# Patient Record
Sex: FEMALE | Race: BLACK OR AFRICAN AMERICAN
[De-identification: names, ages, dates, MRNs, and addresses within clinical notes are randomized per-mention and may not be internally consistent; named-entity substitution may affect disease eponyms.]

---

## 2017-05-29 ENCOUNTER — HOSPITAL ENCOUNTER (EMERGENCY)
Dept: HOSPITAL 74 - JER | Age: 52
Discharge: HOME | End: 2017-05-29
Payer: COMMERCIAL

## 2017-05-29 VITALS — BODY MASS INDEX: 29 KG/M2

## 2017-05-29 VITALS — DIASTOLIC BLOOD PRESSURE: 99 MMHG | SYSTOLIC BLOOD PRESSURE: 139 MMHG | HEART RATE: 88 BPM

## 2017-05-29 VITALS — TEMPERATURE: 97 F
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
## 2017-05-29 DIAGNOSIS — F41.9: ICD-10-CM

## 2017-05-29 DIAGNOSIS — G89.29: ICD-10-CM

## 2017-05-29 DIAGNOSIS — M62.838: Primary | ICD-10-CM

## 2017-05-29 DIAGNOSIS — Z85.3: ICD-10-CM
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
## 2017-05-29 DIAGNOSIS — T14.8: ICD-10-CM
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
## 2017-05-29 DIAGNOSIS — Z87.891: ICD-10-CM

## 2017-05-29 LAB
ALBUMIN SERPL-MCNC: 3.7 G/DL (ref 3.4–5)
ALP SERPL-CCNC: 114 U/L (ref 45–117)
ALT SERPL-CCNC: 17 U/L (ref 12–78)
ANION GAP SERPL CALC-SCNC: 13 MMOL/L (ref 8–16)
APPEARANCE UR: CLEAR
AST SERPL-CCNC: 14 U/L (ref 15–37)
BASOPHILS # BLD: 1.2 % (ref 0–2)
BILIRUB SERPL-MCNC: 0.5 MG/DL (ref 0.2–1)
BILIRUB UR STRIP.AUTO-MCNC: NEGATIVE MG/DL
CALCIUM SERPL-MCNC: 9.4 MG/DL (ref 8.5–10.1)
CO2 SERPL-SCNC: 24 MMOL/L (ref 21–32)
COLOR UR: COLORLESS
CREAT SERPL-MCNC: 0.8 MG/DL (ref 0.55–1.02)
DEPRECATED RDW RBC AUTO: 13.5 % (ref 11.6–15.6)
EOSINOPHIL # BLD: 3.1 % (ref 0–4.5)
GLUCOSE SERPL-MCNC: 99 MG/DL (ref 74–106)
INR BLD: 1.12 (ref 0.82–1.09)
KETONES UR QL STRIP: NEGATIVE
LEUKOCYTE ESTERASE UR QL STRIP.AUTO: NEGATIVE
MCH RBC QN AUTO: 30.6 PG (ref 25.7–33.7)
MCHC RBC AUTO-ENTMCNC: 34 G/DL (ref 32–36)
MCV RBC: 89.9 FL (ref 80–96)
NEUTROPHILS # BLD: 46 % (ref 42.8–82.8)
NITRITE UR QL STRIP: NEGATIVE
PH UR: 8 [PH] (ref 5–8)
PLATELET # BLD AUTO: 248 K/MM3 (ref 134–434)
PMV BLD: 9.4 FL (ref 7.5–11.1)
PROT SERPL-MCNC: 7.3 G/DL (ref 6.4–8.2)
PROT UR QL STRIP: NEGATIVE
PROT UR QL STRIP: NEGATIVE
PT PNL PPP: 12.4 SEC (ref 9.98–11.88)
RBC # UR STRIP: NEGATIVE /UL
SP GR UR: 1.01 (ref 1–1.02)
TROPONIN I SERPL-MCNC: < 0.02 NG/ML (ref 0–0.05)
UROBILINOGEN UR STRIP-MCNC: NEGATIVE E.U./DL (ref 0.2–1)
WBC # BLD AUTO: 6.3 K/MM3 (ref 4–10)

## 2017-05-29 PROCEDURE — 3E023NZ INTRODUCTION OF ANALGESICS, HYPNOTICS, SEDATIVES INTO MUSCLE, PERCUTANEOUS APPROACH: ICD-10-PCS

## 2017-05-29 PROCEDURE — 3E033NZ INTRODUCTION OF ANALGESICS, HYPNOTICS, SEDATIVES INTO PERIPHERAL VEIN, PERCUTANEOUS APPROACH: ICD-10-PCS

## 2017-05-29 RX ADMIN — LORAZEPAM ONE MG: 2 INJECTION, SOLUTION INTRAMUSCULAR; INTRAVENOUS at 08:50

## 2017-05-29 RX ADMIN — LORAZEPAM ONE: 2 INJECTION, SOLUTION INTRAMUSCULAR; INTRAVENOUS at 10:42
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
## 2017-05-30 NOTE — EKG
Test Reason : 

Blood Pressure : ***/*** mmHG

Vent. Rate : 099 BPM     Atrial Rate : 099 BPM

   P-R Int : 132 ms          QRS Dur : 080 ms

    QT Int : 366 ms       P-R-T Axes : 059 -02 006 degrees

   QTc Int : 469 ms

 

NORMAL SINUS RHYTHM

NONSPECIFIC T WAVE ABNORMALITY

ABNORMAL ECG

WHEN COMPARED WITH ECG OF 16-APR-2016 13:54,

T WAVE VARIATION

Confirmed by CARLOS LOZANO MD ([id number]) on 5/30/2017 2:19:19 PM

 

Referred By:             Confirmed By:CARLOS LOZANO MD

## 2017-10-26 ENCOUNTER — HOSPITAL ENCOUNTER (INPATIENT)
Dept: HOSPITAL 74 - JER | Age: 52
LOS: 3 days | Discharge: HOME | DRG: 394 | End: 2017-10-29
Attending: INTERNAL MEDICINE | Admitting: INTERNAL MEDICINE
Payer: COMMERCIAL

## 2017-10-26 VITALS — BODY MASS INDEX: 29.7 KG/M2

## 2017-10-26 DIAGNOSIS — E86.0: ICD-10-CM

## 2017-10-26 DIAGNOSIS — Z85.3: ICD-10-CM

## 2017-10-26 DIAGNOSIS — M10.9: ICD-10-CM

## 2017-10-26 DIAGNOSIS — F41.9: ICD-10-CM

## 2017-10-26 DIAGNOSIS — Z78.0: ICD-10-CM

## 2017-10-26 DIAGNOSIS — T50.4X5A: ICD-10-CM

## 2017-10-26 DIAGNOSIS — K52.1: Primary | ICD-10-CM

## 2017-10-26 DIAGNOSIS — I10: ICD-10-CM

## 2017-10-26 DIAGNOSIS — N17.9: ICD-10-CM

## 2017-10-26 DIAGNOSIS — K21.9: ICD-10-CM

## 2017-10-26 DIAGNOSIS — Z85.41: ICD-10-CM

## 2017-10-26 LAB
ALBUMIN SERPL-MCNC: 4.4 G/DL (ref 3.4–5)
ALP SERPL-CCNC: 118 U/L (ref 45–117)
ALT SERPL-CCNC: 26 U/L (ref 12–78)
ANION GAP SERPL CALC-SCNC: 16 MMOL/L (ref 8–16)
AST SERPL-CCNC: 25 U/L (ref 15–37)
BASOPHILS # BLD: 1.2 % (ref 0–2)
BILIRUB SERPL-MCNC: 0.7 MG/DL (ref 0.2–1)
CALCIUM SERPL-MCNC: 10 MG/DL (ref 8.5–10.1)
CO2 SERPL-SCNC: 17 MMOL/L (ref 21–32)
CREAT SERPL-MCNC: 2 MG/DL (ref 0.55–1.02)
DEPRECATED RDW RBC AUTO: 14.3 % (ref 11.6–15.6)
EOSINOPHIL # BLD: 2.1 % (ref 0–4.5)
GLUCOSE SERPL-MCNC: 86 MG/DL (ref 74–106)
MCH RBC QN AUTO: 29.6 PG (ref 25.7–33.7)
MCHC RBC AUTO-ENTMCNC: 33.2 G/DL (ref 32–36)
MCV RBC: 89.1 FL (ref 80–96)
NEUTROPHILS # BLD: 45.9 % (ref 42.8–82.8)
PLATELET # BLD AUTO: 347 K/MM3 (ref 134–434)
PMV BLD: 9 FL (ref 7.5–11.1)
PROT SERPL-MCNC: 8.9 G/DL (ref 6.4–8.2)
WBC # BLD AUTO: 10.9 K/MM3 (ref 4–10)

## 2017-10-26 PROCEDURE — G0378 HOSPITAL OBSERVATION PER HR: HCPCS

## 2017-10-26 RX ADMIN — DIPHENHYDRAMINE HCL PRN MG: 25 CAPSULE ORAL at 21:53

## 2017-10-26 RX ADMIN — PREDNISONE SCH MG: 20 TABLET ORAL at 15:26

## 2017-10-26 RX ADMIN — ACETAMINOPHEN PRN MG: 325 TABLET ORAL at 19:57

## 2017-10-26 RX ADMIN — RANITIDINE SCH MG: 150 TABLET ORAL at 21:53

## 2017-10-26 RX ADMIN — SODIUM CHLORIDE SCH MLS/HR: 4.5 INJECTION, SOLUTION INTRAVENOUS at 17:45

## 2017-10-26 NOTE — PDOC
Attending Attestation





- Resident


Resident Name: Filipe Grier





- HPI


HPI: 





10/26/17 11:29


Pt presents to the ED complaining of severe pain in her R great toe that 

radiates to her leg.  Also complaining of nausea, vomiting and profuse watery 

diarrhea.  patient was seen by her PMD for toe pain and told that she had gout, 

started on colchicine.  The diarrhea and nausea  began after the colchicine.  

Denies fevers.  Also complaining of shortness of breath.  Patient has a long 

standing history of anxiety and fibromyalgia.  





- Physicial Exam


PE: 





10/26/17 11:59


Agree with residents exam.  Foot shows no signs of infection.  Patient is 

mildly tachycardic and tachypneic.  





- Medical Decision Making





10/26/17 12:01


Patient presents to the ED complaining of pain that is centered in her great 

toe but that radiates to her entire leg.  Pain has been present for several 

days but became acutely worse today. Also complains of nausea and diarrhea 

after starting colchicine that may be secondary to impaired microtubule 

function.  PAtient has a longstanding history of anxiety, and her chest 

symptoms may be secondary to anxiety, but she is tachycardic and has a history 

of malignancy.  Will check d dimer to evaluate for DVT/PE.  Will reassess. 





<Rosangela Rebollar - Last Filed: 10/26/17 11:29>





- Medical Decision Making





10/26/17 13:46


Paged Dr. Mckinney. [phone]





<Elvira Arellano - Last Filed: 10/26/17 13:46>

## 2017-10-26 NOTE — EKG
Test Reason : 

Blood Pressure : ***/*** mmHG

Vent. Rate : 101 BPM     Atrial Rate : 101 BPM

   P-R Int : 112 ms          QRS Dur : 088 ms

    QT Int : 372 ms       P-R-T Axes : 066 026 064 degrees

   QTc Int : 482 ms

 

*** POOR DATA QUALITY, INTERPRETATION MAY BE ADVERSELY AFFECTED

SINUS TACHYCARDIA

POSSIBLE LEFT ATRIAL ENLARGEMENT

LEFT VENTRICULAR HYPERTROPHY

CANNOT RULE OUT SEPTAL INFARCT , AGE UNDETERMINED

ABNORMAL ECG

WHEN COMPARED WITH ECG OF 29-MAY-2017 08:50,

NONSPECIFIC T WAVE ABNORMALITY NO LONGER EVIDENT IN INFERIOR LEADS

NONSPECIFIC T WAVE ABNORMALITY NO LONGER EVIDENT IN ANTERIOR LEADS

Confirmed by VISHAL TATE MD ([id number]) on 10/26/2017 5:21:29 PM

 

Referred By:             Confirmed By:VISHAL TATE MD
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
## 2017-10-27 LAB
ALBUMIN SERPL-MCNC: 3.5 G/DL (ref 3.4–5)
ALP SERPL-CCNC: 108 U/L (ref 45–117)
ALT SERPL-CCNC: 24 U/L (ref 12–78)
ANION GAP SERPL CALC-SCNC: 8 MMOL/L (ref 8–16)
AST SERPL-CCNC: 13 U/L (ref 15–37)
BASOPHILS # BLD: 0.4 % (ref 0–2)
BILIRUB SERPL-MCNC: 0.3 MG/DL (ref 0.2–1)
CALCIUM SERPL-MCNC: 9.1 MG/DL (ref 8.5–10.1)
CO2 SERPL-SCNC: 24 MMOL/L (ref 21–32)
CREAT SERPL-MCNC: 0.7 MG/DL (ref 0.55–1.02)
DEPRECATED RDW RBC AUTO: 14.1 % (ref 11.6–15.6)
EOSINOPHIL # BLD: 0.1 % (ref 0–4.5)
GLUCOSE SERPL-MCNC: 136 MG/DL (ref 74–106)
MCH RBC QN AUTO: 29.7 PG (ref 25.7–33.7)
MCHC RBC AUTO-ENTMCNC: 33.3 G/DL (ref 32–36)
MCV RBC: 89.1 FL (ref 80–96)
NEUTROPHILS # BLD: 81.4 % (ref 42.8–82.8)
PLATELET # BLD AUTO: 277 K/MM3 (ref 134–434)
PMV BLD: 9.3 FL (ref 7.5–11.1)
PROT SERPL-MCNC: 7.2 G/DL (ref 6.4–8.2)
WBC # BLD AUTO: 8.5 K/MM3 (ref 4–10)

## 2017-10-27 RX ADMIN — SODIUM CHLORIDE SCH MLS/HR: 4.5 INJECTION, SOLUTION INTRAVENOUS at 05:41

## 2017-10-27 RX ADMIN — RANITIDINE SCH MG: 150 TABLET ORAL at 21:17

## 2017-10-27 RX ADMIN — VENLAFAXINE HYDROCHLORIDE SCH MG: 150 CAPSULE, EXTENDED RELEASE ORAL at 12:36

## 2017-10-27 RX ADMIN — ACETAMINOPHEN PRN MG: 325 TABLET ORAL at 19:00

## 2017-10-27 RX ADMIN — ACETAMINOPHEN PRN MG: 325 TABLET ORAL at 08:30

## 2017-10-27 RX ADMIN — DIPHENHYDRAMINE HCL PRN MG: 25 CAPSULE ORAL at 10:20

## 2017-10-27 RX ADMIN — RANITIDINE SCH MG: 150 TABLET ORAL at 10:15

## 2017-10-27 RX ADMIN — PREDNISONE SCH MG: 20 TABLET ORAL at 10:14

## 2017-10-27 RX ADMIN — SODIUM CHLORIDE SCH MLS/HR: 4.5 INJECTION, SOLUTION INTRAVENOUS at 13:44
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
## 2017-10-28 LAB
ALBUMIN SERPL-MCNC: 3.6 G/DL (ref 3.4–5)
ALP SERPL-CCNC: 99 U/L (ref 45–117)
ALT SERPL-CCNC: 24 U/L (ref 12–78)
ANION GAP SERPL CALC-SCNC: 8 MMOL/L (ref 8–16)
AST SERPL-CCNC: 13 U/L (ref 15–37)
BILIRUB SERPL-MCNC: 0.2 MG/DL (ref 0.2–1)
CALCIUM SERPL-MCNC: 8.7 MG/DL (ref 8.5–10.1)
CO2 SERPL-SCNC: 24 MMOL/L (ref 21–32)
CREAT SERPL-MCNC: 0.8 MG/DL (ref 0.55–1.02)
DEPRECATED RDW RBC AUTO: 13.8 % (ref 11.6–15.6)
GLUCOSE SERPL-MCNC: 138 MG/DL (ref 74–106)
MCH RBC QN AUTO: 29.7 PG (ref 25.7–33.7)
MCHC RBC AUTO-ENTMCNC: 33.4 G/DL (ref 32–36)
MCV RBC: 89.1 FL (ref 80–96)
PLATELET # BLD EST: ADEQUATE 10*3/UL
PLATELET COMMENT2: (no result)
PMV BLD: 9.8 FL (ref 7.5–11.1)
PROT SERPL-MCNC: 6.9 G/DL (ref 6.4–8.2)
WBC # BLD AUTO: 8.5 K/MM3 (ref 4–10)

## 2017-10-28 RX ADMIN — VENLAFAXINE HYDROCHLORIDE SCH MG: 150 CAPSULE, EXTENDED RELEASE ORAL at 10:47

## 2017-10-28 RX ADMIN — PREDNISONE SCH MG: 20 TABLET ORAL at 10:46

## 2017-10-28 RX ADMIN — NAPROXEN SCH: 500 TABLET ORAL at 22:03

## 2017-10-28 RX ADMIN — RANITIDINE SCH MG: 150 TABLET ORAL at 22:02

## 2017-10-28 RX ADMIN — ACETAMINOPHEN PRN MG: 325 TABLET ORAL at 17:30

## 2017-10-28 RX ADMIN — ENOXAPARIN SODIUM SCH MG: 40 INJECTION SUBCUTANEOUS at 10:49

## 2017-10-28 RX ADMIN — SODIUM CHLORIDE SCH MLS/HR: 4.5 INJECTION, SOLUTION INTRAVENOUS at 09:45

## 2017-10-28 RX ADMIN — ACETAMINOPHEN PRN MG: 325 TABLET ORAL at 08:42

## 2017-10-28 RX ADMIN — ACETAMINOPHEN PRN MG: 325 TABLET ORAL at 22:02

## 2017-10-28 RX ADMIN — ACETAMINOPHEN PRN MG: 325 TABLET ORAL at 01:39

## 2017-10-28 RX ADMIN — SODIUM CHLORIDE SCH MLS/HR: 4.5 INJECTION, SOLUTION INTRAVENOUS at 00:26

## 2017-10-28 RX ADMIN — SODIUM CHLORIDE SCH: 4.5 INJECTION, SOLUTION INTRAVENOUS at 15:05

## 2017-10-28 RX ADMIN — NAPROXEN SCH MG: 500 TABLET ORAL at 15:09

## 2017-10-28 RX ADMIN — SODIUM CHLORIDE SCH MLS/HR: 4.5 INJECTION, SOLUTION INTRAVENOUS at 20:24

## 2017-10-28 RX ADMIN — RANITIDINE SCH MG: 150 TABLET ORAL at 10:48

## 2017-10-28 NOTE — PN
Progress Note (short form)





- Note


Progress Note: 


pt seen examined


still having pain - feet


slight improvement


anxious


no other issues


renal function back to normal





 Vital Signs











Temp  98.1 F   10/28/17 06:00


 


Pulse  77   10/28/17 06:00


 


Resp  20   10/28/17 06:00


 


BP  130/87   10/28/17 06:00


 


Pulse Ox  100   10/27/17 19:57








 Intake & Output











 10/27/17 10/28/17 10/28/17





 23:59 11:59 23:59


 


Intake Total 950 1400 


 


Balance 950 1400 


 


Intake:   


 


  IV  1200 


 


    1/2 Normal Saline 1,000  1200 





    ml @ 100 mls/hr IV ASDIR   





    [facility] Rx#:[id number]   


 


  Oral 950 200 


 


Other:   


 


  Voiding Method Toilet  


 


  # Unmeasured Voids   


 


    Void 2  








Active Medications





Acetaminophen (Tylenol -)  325 mg PO Q4H PRN


   PRN Reason: PAIN


   Stop: 10/29/17 16:15


   Last Admin: 10/28/17 08:42 Dose:  325 mg


Diphenhydramine HCl (Benadryl -)  25 mg PO Q6H PRN


   PRN Reason: FOR ITCHING


   Last Admin: 10/27/17 10:20 Dose:  25 mg


Enoxaparin Sodium (Lovenox -)  40 mg SQ DAILY [facility]


   Last Admin: 10/28/17 10:49 Dose:  40 mg


Sodium Chloride (1/2 Normal Saline)  1,000 mls @ 100 mls/hr IV ASDIR [facility]


   Last Admin: 10/28/17 09:45 Dose:  100 mls/hr


Naproxen (Naprosyn -)  500 mg PO BID [facility]


Oxycodone HCl (Roxicodone -)  5 mg PO Q4H PRN


   PRN Reason: PAIN


   Last Admin: 10/28/17 08:40 Dose:  5 mg


Prednisone (Deltasone -)  40 mg PO DAILY [facility]


   Last Admin: 10/28/17 10:46 Dose:  40 mg


Ranitidine HCl (Zantac -)  150 mg PO BID [facility]


   Last Admin: 10/28/17 10:48 Dose:  150 mg


Venlafaxine HCl (Effexor Xr -)  150 mg PO DAILY [facility]


   Last Admin: 10/28/17 10:47 Dose:  150 mg





 CBC, BMP





 10/27/17 18:06 





 10/27/17 18:06 





Physical Examination


Constitutional: Yes: No Distress, Anxious


Eyes: Yes: Conjunctiva Clear


Neck: Yes: Supple


Cardiovascular: Yes: Regular Rate and Rhythm


Respiratory: Yes: CTA Bilaterally


Gastrointestinal: Yes: Soft


Musculoskeletal: Yes: Other (both great toes--- tender to touch-  but decreased 

---scars present from previous surgeries  on both great toes)


Edema: No


Peripheral Pulses WNL: Yes


Neurological: Yes: Alert





Imaging





- Results


X-ray: Report Reviewed


Ultrasound: Report Reviewed


EKG: Report Reviewed





Problem List





- Problems


(1) CLARISSA (acute kidney injury)


Code(s): N17.9 - ACUTE KIDNEY FAILURE, UNSPECIFIED





(2) Diarrhea due to drug


Code(s): K52.1 - TOXIC GASTROENTERITIS AND COLITIS





(3) Anxiety


Code(s): F41.9 - ANXIETY DISORDER, UNSPECIFIED








Assessment/Plan


discussed with patient


Diarrhea likely due to colchicine--Held


Continue fluids


Monitor labs--- today's labs pending


ct better


Pain control


start on naproxyn with caution


Benadryl for itching


DVT prophylaxis


Will follow


anticipate d/c tomorrow if better














Problem List





- Problems


(1) CLARISSA (acute kidney injury)


Code(s): N17.9 - ACUTE KIDNEY FAILURE, UNSPECIFIED





(2) Diarrhea due to drug


Code(s): K52.1 - TOXIC GASTROENTERITIS AND COLITIS





(3) Anxiety


Code(s): F41.9 - ANXIETY DISORDER, UNSPECIFIED

## 2017-10-29 VITALS — TEMPERATURE: 98 F

## 2017-10-29 VITALS — SYSTOLIC BLOOD PRESSURE: 133 MMHG | DIASTOLIC BLOOD PRESSURE: 93 MMHG | HEART RATE: 90 BPM

## 2017-10-29 LAB
ANION GAP SERPL CALC-SCNC: 9 MMOL/L (ref 8–16)
BASOPHILS # BLD: 0.8 % (ref 0–2)
CALCIUM SERPL-MCNC: 8.8 MG/DL (ref 8.5–10.1)
CO2 SERPL-SCNC: 28 MMOL/L (ref 21–32)
CREAT SERPL-MCNC: 0.6 MG/DL (ref 0.55–1.02)
DEPRECATED RDW RBC AUTO: 13.8 % (ref 11.6–15.6)
EOSINOPHIL # BLD: 0.7 % (ref 0–4.5)
GLUCOSE SERPL-MCNC: 93 MG/DL (ref 74–106)
MCH RBC QN AUTO: 29.8 PG (ref 25.7–33.7)
MCHC RBC AUTO-ENTMCNC: 33.6 G/DL (ref 32–36)
MCV RBC: 88.6 FL (ref 80–96)
NEUTROPHILS # BLD: 53 % (ref 42.8–82.8)
PLATELET # BLD AUTO: 267 K/MM3 (ref 134–434)
PMV BLD: 8.7 FL (ref 7.5–11.1)
WBC # BLD AUTO: 9.1 K/MM3 (ref 4–10)

## 2017-10-29 RX ADMIN — ACETAMINOPHEN PRN MG: 325 TABLET ORAL at 05:49

## 2017-10-29 RX ADMIN — PREDNISONE SCH MG: 20 TABLET ORAL at 09:29

## 2017-10-29 RX ADMIN — RANITIDINE SCH MG: 150 TABLET ORAL at 09:30

## 2017-10-29 RX ADMIN — NAPROXEN SCH: 500 TABLET ORAL at 09:31

## 2017-10-29 RX ADMIN — SODIUM CHLORIDE SCH MLS/HR: 4.5 INJECTION, SOLUTION INTRAVENOUS at 06:38

## 2017-10-29 RX ADMIN — VENLAFAXINE HYDROCHLORIDE SCH MG: 150 CAPSULE, EXTENDED RELEASE ORAL at 09:30

## 2017-10-29 RX ADMIN — ENOXAPARIN SODIUM SCH MG: 40 INJECTION SUBCUTANEOUS at 09:30

## 2017-10-29 RX ADMIN — ACETAMINOPHEN PRN MG: 325 TABLET ORAL at 10:41

## 2017-10-29 NOTE — DS
Physical Examination


Vital Signs: 


 Vital Signs











Temperature  98 F   10/29/17 06:00


 


Pulse Rate  75   10/29/17 06:00


 


Respiratory Rate  20   10/29/17 06:00


 


Blood Pressure  148/102   10/29/17 06:00


 


O2 Sat by Pulse Oximetry (%)  100   10/28/17 21:00











Findings/Remarks: 


feels better


decreased pain


walks in hallway





Constitutional: Yes: No Distress, Calm


Cardiovascular: Yes: Regular Rate and Rhythm


Respiratory: Yes: CTA Bilaterally


Gastrointestinal: Yes: Soft


Musculoskeletal: Yes: Other (decreased tenderness)


Neurological: Yes: Alert


Labs: 


 CBC, BMP





 10/29/17 07:34 











Discharge Summary


Reason For Visit: ACUTE KIDNEY INJURY,DEHYDRATION


Current Active Problems





CLARISSA (acute kidney injury) (Acute) 


Anxiety (Acute) 


Diarrhea due to drug (Acute) 








Hospital Course: 


admitted for acute gout flare/ arf


had severe diarrhea with colchicine


treated with fluids/ pain meds


restarted nsaid for pain.


 better-- 


stable for d/c 


f/u in office in one week.


meds reconcilled .


prescribed as needed.


discussed with pt in detail





Condition: Improved





- Instructions


Referrals: 


Lubna Mckinney MD [Primary Care Provider] - 





- Home Medications


Comprehensive Discharge Medication List: 


Ambulatory Orders





Esomeprazole Magnesium [Nexium 24Hr] 20 mg PO DAILY 05/29/17 


Venlafaxine HCl ER [Effexor Xr -] 150 mg PO DAILY 05/29/17 


Alprazolam [Xanax] 0.5 mg PO TID PRN 10/26/17 


Acetaminophen [Tylenol .Regular Strength -] 325 mg PO Q4H PRN #0 tablet 10/29/

17 


Meloxicam [Mobic] 15 mg PO DAILY PRN #30 tab 10/29/17 


Prednisone [Deltasone -] 20 mg PO DAILY #6 tablet 10/29/17

## 2017-10-30 ENCOUNTER — HOSPITAL ENCOUNTER (INPATIENT)
Dept: HOSPITAL 74 - JER | Age: 52
LOS: 2 days | Discharge: LEFT BEFORE DISCHARGE | DRG: 390 | End: 2017-11-01
Payer: COMMERCIAL

## 2017-10-30 VITALS — BODY MASS INDEX: 29.7 KG/M2

## 2017-10-30 DIAGNOSIS — F41.9: ICD-10-CM

## 2017-10-30 DIAGNOSIS — K56.609: Primary | ICD-10-CM

## 2017-10-30 DIAGNOSIS — K56.7: ICD-10-CM

## 2017-10-30 DIAGNOSIS — Z85.3: ICD-10-CM

## 2017-10-30 DIAGNOSIS — R74.8: ICD-10-CM

## 2017-10-30 LAB
ALBUMIN SERPL-MCNC: 3.5 G/DL (ref 3.4–5)
ALP SERPL-CCNC: 89 U/L (ref 45–117)
ALT SERPL-CCNC: 24 U/L (ref 12–78)
ANION GAP SERPL CALC-SCNC: 9 MMOL/L (ref 8–16)
APPEARANCE UR: (no result)
AST SERPL-CCNC: 16 U/L (ref 15–37)
BACTERIA #/AREA URNS HPF: (no result) /HPF
BASOPHILS # BLD: 1.1 % (ref 0–2)
BILIRUB SERPL-MCNC: 0.6 MG/DL (ref 0.2–1)
BILIRUB UR STRIP.AUTO-MCNC: NEGATIVE MG/DL
CALCIUM SERPL-MCNC: 8.4 MG/DL (ref 8.5–10.1)
CK SERPL-CCNC: 96 IU/L (ref 26–192)
CO2 SERPL-SCNC: 25 MMOL/L (ref 21–32)
COLOR UR: (no result)
CREAT SERPL-MCNC: 0.7 MG/DL (ref 0.55–1.02)
DEPRECATED RDW RBC AUTO: 14 % (ref 11.6–15.6)
EOSINOPHIL # BLD: 1.1 % (ref 0–4.5)
GLUCOSE SERPL-MCNC: 82 MG/DL (ref 74–106)
KETONES UR QL STRIP: NEGATIVE
LEUKOCYTE ESTERASE UR QL STRIP.AUTO: (no result)
MCH RBC QN AUTO: 29.8 PG (ref 25.7–33.7)
MCHC RBC AUTO-ENTMCNC: 33.5 G/DL (ref 32–36)
MCV RBC: 88.8 FL (ref 80–96)
NEUTROPHILS # BLD: 45.9 % (ref 42.8–82.8)
NITRITE UR QL STRIP: NEGATIVE
PH UR: 7 [PH] (ref 5–8)
PLATELET # BLD AUTO: 245 K/MM3 (ref 134–434)
PMV BLD: 8.8 FL (ref 7.5–11.1)
PROT SERPL-MCNC: 7.1 G/DL (ref 6.4–8.2)
PROT UR QL STRIP: NEGATIVE
PROT UR QL STRIP: NEGATIVE
RBC # BLD AUTO: (no result) /HPF (ref 0–3)
RBC # UR STRIP: NEGATIVE /UL
SP GR UR: 1.01 (ref 1–1.03)
TROPONIN I SERPL-MCNC: < 0.02 NG/ML (ref 0–0.05)
UROBILINOGEN UR STRIP-MCNC: NEGATIVE MG/DL (ref 0.2–1)
WBC # BLD AUTO: 10.6 K/MM3 (ref 4–10)
WBC # UR AUTO: (no result) /UL (ref 3–5)
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
## 2017-10-31 RX ADMIN — SODIUM PHOSPHATE, DIBASIC AND SODIUM PHOSPHATE, MONOBASIC SCH ML: 7; 19 ENEMA RECTAL at 23:00

## 2017-10-31 RX ADMIN — MORPHINE SULFATE PRN MG: 8 INJECTION, SOLUTION INTRAMUSCULAR; INTRAVENOUS at 17:34

## 2017-10-31 RX ADMIN — MORPHINE SULFATE PRN MG: 8 INJECTION, SOLUTION INTRAMUSCULAR; INTRAVENOUS at 22:52

## 2017-10-31 RX ADMIN — MORPHINE SULFATE PRN MG: 8 INJECTION, SOLUTION INTRAMUSCULAR; INTRAVENOUS at 13:47

## 2017-10-31 RX ADMIN — LEVOFLOXACIN SCH MLS/HR: 5 INJECTION, SOLUTION INTRAVENOUS at 18:55
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
## 2017-10-31 NOTE — CON.GI
Consult


Consult Specialty:: gastroenterology 


Referred by:: Dr Demetri Reinoso





- History of Present Illness


History of Present Illness: 


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 by Dr Hanna at [facility] 2011]was asked to be seen because of SBO. She was admitted on Oct 26-Oct 

29,2017 because of diarrhea secondary to adverse of colchicine associated with 

CLARISSA. She received morphine and oxycodone during the admission. She was doing 

well until today when she developed severe abdominal pain associated with 5 day 

history  of constipation. Catscan revealed patyial SBO with fecal impaction.





- Past Medical History


Cardio/Vascular: Yes: HTN





- Alcohol/Substance Use


Hx Alcohol Use: No





- Smoking History


Smoking history: Never smoked


Have you smoked in the past 12 months: No


Aproximately how many cigarettes per day: 0


If you are a former smoker, when did you quit?: 20 years





Home Medications





- Allergies


Allergies/Adverse Reactions: 


 Allergies











Allergy/AdvReac Type Severity Reaction Status Date / Time


 


ketorolac tromethamine Allergy Intermediate Rash Verified 10/30/17 19:26





[From Toradol]     


 


colchicine Allergy   Verified 10/30/17 19:26














- Home Medications


Home Medications: 


Ambulatory Orders





Esomeprazole Magnesium [Nexium 24Hr] 20 mg PO DAILY 05/29/17 


Venlafaxine HCl ER [Effexor Xr -] 150 mg PO DAILY 05/29/17 


Alprazolam [Xanax] 0.5 mg PO TID PRN 10/26/17 


Acetaminophen [Tylenol .Regular Strength -] 325 mg PO Q4H PRN #0 tablet 10/29/

17 


Meloxicam [Mobic] 15 mg PO DAILY PRN #30 tab 10/29/17 


Prednisone [Deltasone -] 20 mg PO DAILY #6 tablet 10/29/17 











Physical Exam-GI


Vital Signs: 


 Vital Signs











Temperature  98.6 F   10/31/17 16:55


 


Pulse Rate  79   10/31/17 16:55


 


Respiratory Rate  20   10/31/17 16:55


 


Blood Pressure  117/97   10/31/17 16:55


 


O2 Sat by Pulse Oximetry (%)  100   10/31/17 10:00











Constitutional: Yes: Obese


Eyes: Yes: Conjunctiva Clear


HENT: Yes: Atraumatic


Neck: Yes: Supple


Cardiovascular: Yes: Regular Rate and Rhythm


Respiratory: Yes: CTA Bilaterally


Gastrointestinal Inspection: Yes: Distention


...Palpate: Yes: Soft, Tenderness (--diffuse).  No: Firm/Rigid, Guarding, 

Hepatomegaly, Mass, Pulsatile Mass, Splenomegaly


...Percussion: Yes: Tympanitic


Labs: 


CBC,CMP











WBC  10.6 K/mm3 (4.0-10.0)  H  10/30/17  21:30    


 


RBC  4.52 M/mm3 (3.60-5.2)   10/30/17  21:30    


 


Hgb  13.5 GM/dL (10.7-15.3)   10/30/17  21:30    


 


Hct  40.1 % (32.4-45.2)   10/30/17  21:30    


 


MCV  88.8 fl (80-96)   10/30/17  21:30    


 


MCH  29.8 pg (25.7-33.7)   10/30/17  21:30    


 


MCHC  33.5 g/dl (32.0-36.0)   10/30/17  21:30    


 


RDW  14.0 % (11.6-15.6)   10/30/17  21:30    


 


Plt Count  245 K/MM3 (134-434)   10/30/17  21:30    


 


MPV  8.8 fl (7.5-11.1)   10/30/17  21:30    


 


Neutrophils %  45.9 % (42.8-82.8)   10/30/17  21:30    


 


Lymphocytes %  45.8 % (8-40)  H  10/30/17  21:30    


 


Monocytes %  6.1 % (3.8-10.2)  D 10/30/17  21:30    


 


Eosinophils %  1.1 % (0-4.5)   10/30/17  21:30    


 


Basophils %  1.1 % (0-2.0)   10/30/17  21:30    


 


Sodium  139 mmol/L (136-145)   10/30/17  21:30    


 


Potassium  3.6 mmol/L (3.5-5.1)   10/30/17  21:30    


 


Chloride  105 mmol/L ()   10/30/17  21:30    


 


Carbon Dioxide  25 mmol/L (21-32)   10/30/17  21:30    


 


Anion Gap  9  (8-16)   10/30/17  21:30    


 


BUN  16 mg/dL (7-18)  D 10/30/17  21:30    


 


Creatinine  0.7 mg/dL (0.55-1.02)   10/30/17  21:30    


 


Creat Clearance w eGFR  > 60  (>60)   10/30/17  21:30    


 


Random Glucose  82 mg/dL ()   10/30/17  21:30    


 


Lactic Acid  1.5 mmol/L (0.4-2.0)   10/30/17  21:30    


 


Calcium  8.4 mg/dL (8.5-10.1)  L  10/30/17  21:30    


 


Total Bilirubin  0.6 mg/dL (0.2-1.0)  D 10/30/17  21:30    


 


AST  16 U/L (15-37)  D 10/30/17  21:30    


 


ALT  24 U/L (12-78)   10/30/17  21:30    


 


Alkaline Phosphatase  89 U/L ()   10/30/17  21:30    


 


Creatine Kinase  96 IU/L ()   10/30/17  21:30    


 


Troponin I  < 0.02 ng/ml (0.00-0.05)   10/30/17  21:30    


 


Total Protein  7.1 g/dl (6.4-8.2)   10/30/17  21:30    


 


Albumin  3.5 g/dl (3.4-5.0)   10/30/17  21:30    


 


Lipase  943 U/L ()  H  10/30/17  21:30    


 


Serum Pregnancy, Qual  Negative   10/30/17  21:30    














Assessment/Plan


A>1) abdominal pain rule secondary to ileus vs SBO vs opiod induced constipation





R> keep NPO


fleet enemas as ordered to relieve the fecal impaction


FUA

## 2017-10-31 NOTE — EKG
Test Reason : 

Blood Pressure : ***/*** mmHG

Vent. Rate : 081 BPM     Atrial Rate : 081 BPM

   P-R Int : 134 ms          QRS Dur : 090 ms

    QT Int : 412 ms       P-R-T Axes : 043 -08 050 degrees

   QTc Int : 478 ms

 

NORMAL SINUS RHYTHM

INCOMPLETE LEFT BUNDLE BRANCH BLOCK

MODERATE VOLTAGE CRITERIA FOR LVH, MAY BE NORMAL VARIANT

NONSPECIFIC T WAVE ABNORMALITY

RSR' V1-V2

SMALL Q WAVE IN V2, CANNOT EXCLUDEVAN ANTEROSEPTAL WALL MI OF

INDETERMINATE AGE

PROLONGED QT

ABNORMAL ECG

WHEN COMPARED WITH ECG OF 26-OCT-2017 09:40,

CHANGES AS MENTIONED ABOVE.

NONSPECIFIC ST AND T WAVE ABNORMALITY

CLINICAL CORRELATION IS RECOMMENDED

Confirmed by PEDRO COREAS MD ([id number]) on 10/31/2017 1:50:30 PM

 

Referred By:             Confirmed By:PEDRO COREAS MD

## 2017-10-31 NOTE — CON.GI
Consult


Consult Specialty:: GI


Referred by:: Dr Ernst


Reason for Consultation:: Abdominal pain





- History of Present Illness


Chief Complaint: abdominal pain


History of Present Illness: 


52 F with h/o breast ca s/p R mastectomy and subsequent breast reconstruction, 

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 

anastomosis, mesenteric ischemia 2015, now states that after having a large BM 

yesteday she developed acute LUQ pain which became worse over time resulting in 

her ER visit. 





- History Source


History Provided By: Patient


Limitations to Obtaining History: No Limitations





- Past Medical History


Cardio/Vascular: Yes: HTN





- Past Surgical History


Past Surgical History: Yes: Colectomy, Mastectomy





- Alcohol/Substance Use


Hx Alcohol Use: No





- Smoking History


Smoking history: Never smoked


Have you smoked in the past 12 months: No


Aproximately how many cigarettes per day: 0


If you are a former smoker, when did you quit?: 20 years





Home Medications





- Allergies


Allergies/Adverse Reactions: 


 Allergies











Allergy/AdvReac Type Severity Reaction Status Date / Time


 


ketorolac tromethamine Allergy Intermediate Rash Verified 10/30/17 19:26





[From Toradol]     


 


colchicine Allergy   Verified 10/30/17 19:26














- Home Medications


Home Medications: 


Ambulatory Orders





Esomeprazole Magnesium [Nexium 24Hr] 20 mg PO DAILY 05/29/17 


Venlafaxine HCl ER [Effexor Xr -] 150 mg PO DAILY 05/29/17 


Alprazolam [Xanax] 0.5 mg PO TID PRN 10/26/17 


Acetaminophen [Tylenol .Regular Strength -] 325 mg PO Q4H PRN #0 tablet 10/29/

17 


Meloxicam [Mobic] 15 mg PO DAILY PRN #30 tab 10/29/17 


Prednisone [Deltasone -] 20 mg PO DAILY #6 tablet 10/29/17 











Physical Exam-GI


Vital Signs: 


 Vital Signs











Temperature  98.6 F   10/31/17 16:55


 


Pulse Rate  79   10/31/17 16:55


 


Respiratory Rate  20   10/31/17 16:55


 


Blood Pressure  117/97   10/31/17 16:55


 


O2 Sat by Pulse Oximetry (%)  100   10/31/17 10:00











Constitutional: Yes: Well Nourished, Calm


HENT: Yes: Normocephalic


Cardiovascular: Yes: Regular Rate and Rhythm


Respiratory: Yes: CTA Bilaterally


Gastrointestinal Inspection: Yes: WNL


...Auscultate: Yes: Other (high pitched bowel sounds)


...Palpate: Yes: Soft, Tenderness (Mostly left sided)


...Percussion: Yes: Tympanitic


Labs: 


 CBC, BMP





 10/30/17 21:30 





 10/30/17 21:30 





 Hepatic Panel











Total Bilirubin  0.6 mg/dL (0.2-1.0)  D 10/30/17  21:30    


 


AST  16 U/L (15-37)  D 10/30/17  21:30    


 


ALT  24 U/L (12-78)   10/30/17  21:30    


 


Alkaline Phosphatase  89 U/L ()   10/30/17  21:30    


 


Albumin  3.5 g/dl (3.4-5.0)   10/30/17  21:30    














Imaging





- Results


Cat Scan: Report Reviewed (As noted. Ileus vs fecal impaction with partial 

obstruction)





Assessment/Plan


Patient with above history now with acute abdominal pain and elevated lipase. 


Lipase possibly from bowel. 


As pain began after large BM, unlikely impaction


Poss ischemic event as she has a h/o mesenteric ischemia 


Rec IVF, AbRx. 


Clear liquids for now


Check lactic acid


Event seems to be resolving but needs to be watched

## 2017-11-01 VITALS — SYSTOLIC BLOOD PRESSURE: 133 MMHG | TEMPERATURE: 98.2 F | DIASTOLIC BLOOD PRESSURE: 92 MMHG | HEART RATE: 80 BPM

## 2017-11-01 LAB
ALBUMIN SERPL-MCNC: 3.4 G/DL (ref 3.4–5)
ALP SERPL-CCNC: 89 U/L (ref 45–117)
ALT SERPL-CCNC: 27 U/L (ref 12–78)
AMYLASE SERPL-CCNC: 100 U/L (ref 25–115)
ANION GAP SERPL CALC-SCNC: 7 MMOL/L (ref 8–16)
AST SERPL-CCNC: 17 U/L (ref 15–37)
BASOPHILS # BLD: 1.1 % (ref 0–2)
BILIRUB SERPL-MCNC: 0.8 MG/DL (ref 0.2–1)
CALCIUM SERPL-MCNC: 8.4 MG/DL (ref 8.5–10.1)
CO2 SERPL-SCNC: 29 MMOL/L (ref 21–32)
CREAT SERPL-MCNC: 0.7 MG/DL (ref 0.55–1.02)
DEPRECATED RDW RBC AUTO: 13.8 % (ref 11.6–15.6)
EOSINOPHIL # BLD: 5.8 % (ref 0–4.5)
GLUCOSE SERPL-MCNC: 93 MG/DL (ref 74–106)
MCH RBC QN AUTO: 29.5 PG (ref 25.7–33.7)
MCHC RBC AUTO-ENTMCNC: 33.1 G/DL (ref 32–36)
MCV RBC: 89.3 FL (ref 80–96)
NEUTROPHILS # BLD: 49.4 % (ref 42.8–82.8)
PLATELET # BLD AUTO: 234 K/MM3 (ref 134–434)
PLATELET # BLD EST: ADEQUATE 10*3/UL
PLATELET COMMENT2: (no result)
PMV BLD: 9.1 FL (ref 7.5–11.1)
PROT SERPL-MCNC: 7 G/DL (ref 6.4–8.2)
WBC # BLD AUTO: 6.7 K/MM3 (ref 4–10)

## 2017-11-01 RX ADMIN — LEVOFLOXACIN SCH: 5 INJECTION, SOLUTION INTRAVENOUS at 09:28

## 2017-11-01 RX ADMIN — SODIUM PHOSPHATE, DIBASIC AND SODIUM PHOSPHATE, MONOBASIC SCH ML: 7; 19 ENEMA RECTAL at 07:27

## 2017-11-01 RX ADMIN — SODIUM PHOSPHATE, DIBASIC AND SODIUM PHOSPHATE, MONOBASIC SCH ML: 7; 19 ENEMA RECTAL at 03:00

## 2017-11-01 NOTE — CONSULT
Consult - text type





- Consultation


Consultation Note: 


Attempted to see patient, but she has already left AMA. 





Roldan GIANG

## 2017-11-01 NOTE — DS
Physical Examination


Vital Signs: 


 Vital Signs











Temperature  98.2 F   11/01/17 07:33


 


Pulse Rate  80   11/01/17 07:33


 


Respiratory Rate  20   11/01/17 07:33


 


Blood Pressure  133/92   11/01/17 07:33


 


O2 Sat by Pulse Oximetry (%)  100   10/31/17 10:00











Labs: 


 CBC, BMP





 11/01/17 07:00 





 11/01/17 07:00 











Discharge Summary


Reason For Visit: INCREASED SERUM LIPASE LEVEL, ABDOMINAL PAIN


Current Active Problems





CLARISSA (acute kidney injury) (Acute) 


Abdominal pain (Acute) 


Anxiety (Acute) 


Diarrhea due to drug (Acute) 


Elevated lipase (Acute) 


SBO (small bowel obstruction) (Acute) 








Hospital Course: 


pt signed out AMA prior to seeing me today.


Admitted for SBO , ileus





Condition: Guarded





- Instructions


Referrals: 


Lubna Mckinney MD [Primary Care Provider] - 


Disposition: AGAINST MEDICAL ADVICE





- Home Medications


Comprehensive Discharge Medication List: 


Ambulatory Orders





Esomeprazole Magnesium [Nexium 24Hr] 20 mg PO DAILY 05/29/17 


Venlafaxine HCl ER [Effexor Xr -] 150 mg PO DAILY 05/29/17 


Alprazolam [Xanax] 0.5 mg PO TID PRN 10/26/17 


Acetaminophen [Tylenol .Regular Strength -] 325 mg PO Q4H PRN #0 tablet 10/29/

17 


Meloxicam [Mobic] 15 mg PO DAILY PRN #30 tab 10/29/17 


Prednisone [Deltasone -] 20 mg PO DAILY #6 tablet 10/29/17

## 2018-10-14 ENCOUNTER — HOSPITAL ENCOUNTER (EMERGENCY)
Dept: HOSPITAL 74 - JER | Age: 53
Discharge: HOME | End: 2018-10-14
Payer: COMMERCIAL

## 2018-10-14 VITALS — BODY MASS INDEX: 31.4 KG/M2

## 2018-10-14 VITALS — DIASTOLIC BLOOD PRESSURE: 82 MMHG | HEART RATE: 91 BPM | TEMPERATURE: 98 F | SYSTOLIC BLOOD PRESSURE: 126 MMHG

## 2018-10-14 DIAGNOSIS — B34.9: ICD-10-CM

## 2018-10-14 DIAGNOSIS — Z90.49: ICD-10-CM

## 2018-10-14 DIAGNOSIS — Z85.3: ICD-10-CM

## 2018-10-14 DIAGNOSIS — Z90.11: ICD-10-CM

## 2018-10-14 DIAGNOSIS — M79.7: ICD-10-CM

## 2018-10-14 DIAGNOSIS — G62.9: ICD-10-CM

## 2018-10-14 DIAGNOSIS — R51: Primary | ICD-10-CM

## 2018-10-14 LAB
ALBUMIN SERPL-MCNC: 3.7 G/DL (ref 3.4–5)
ALP SERPL-CCNC: 106 U/L (ref 45–117)
ALT SERPL-CCNC: 29 U/L (ref 13–61)
ANION GAP SERPL CALC-SCNC: 10 MMOL/L (ref 8–16)
APPEARANCE UR: CLEAR
AST SERPL-CCNC: 22 U/L (ref 15–37)
BASOPHILS # BLD: 0.7 % (ref 0–2)
BILIRUB SERPL-MCNC: 0.3 MG/DL (ref 0.2–1)
BILIRUB UR STRIP.AUTO-MCNC: NEGATIVE MG/DL
BUN SERPL-MCNC: 11 MG/DL (ref 7–18)
CALCIUM SERPL-MCNC: 9.2 MG/DL (ref 8.5–10.1)
CHLORIDE SERPL-SCNC: 108 MMOL/L (ref 98–107)
CO2 SERPL-SCNC: 23 MMOL/L (ref 21–32)
COLOR UR: (no result)
CREAT SERPL-MCNC: 0.7 MG/DL (ref 0.55–1.3)
DEPRECATED RDW RBC AUTO: 14.4 % (ref 11.6–15.6)
EOSINOPHIL # BLD: 5 % (ref 0–4.5)
GLUCOSE SERPL-MCNC: 96 MG/DL (ref 74–106)
HCT VFR BLD CALC: 41.6 % (ref 32.4–45.2)
HGB BLD-MCNC: 13.7 GM/DL (ref 10.7–15.3)
KETONES UR QL STRIP: NEGATIVE
LEUKOCYTE ESTERASE UR QL STRIP.AUTO: NEGATIVE
LYMPHOCYTES # BLD: 43.7 % (ref 8–40)
MAGNESIUM SERPL-MCNC: 2.1 MG/DL (ref 1.8–2.4)
MCH RBC QN AUTO: 29.9 PG (ref 25.7–33.7)
MCHC RBC AUTO-ENTMCNC: 33.1 G/DL (ref 32–36)
MCV RBC: 90.3 FL (ref 80–96)
MONOCYTES # BLD AUTO: 5.5 % (ref 3.8–10.2)
NEUTROPHILS # BLD: 45.1 % (ref 42.8–82.8)
NITRITE UR QL STRIP: NEGATIVE
PH UR: 7 [PH] (ref 5–8)
PHOSPHATE SERPL-MCNC: 3.1 MG/DL (ref 2.5–4.9)
PLATELET # BLD AUTO: 267 K/MM3 (ref 134–434)
PMV BLD: 9.1 FL (ref 7.5–11.1)
POTASSIUM SERPLBLD-SCNC: 3.9 MMOL/L (ref 3.5–5.1)
PROT SERPL-MCNC: 8.1 G/DL (ref 6.4–8.2)
PROT UR QL STRIP: NEGATIVE
PROT UR QL STRIP: NEGATIVE
RBC # BLD AUTO: 4.6 M/MM3 (ref 3.6–5.2)
SODIUM SERPL-SCNC: 140 MMOL/L (ref 136–145)
SP GR UR: 1.01 (ref 1.01–1.03)
UROBILINOGEN UR STRIP-MCNC: NEGATIVE MG/DL (ref 0.2–1)
WBC # BLD AUTO: 6.8 K/MM3 (ref 4–10)

## 2018-10-14 PROCEDURE — 3E0337Z INTRODUCTION OF ELECTROLYTIC AND WATER BALANCE SUBSTANCE INTO PERIPHERAL VEIN, PERCUTANEOUS APPROACH: ICD-10-PCS

## 2018-10-14 PROCEDURE — 3E033GC INTRODUCTION OF OTHER THERAPEUTIC SUBSTANCE INTO PERIPHERAL VEIN, PERCUTANEOUS APPROACH: ICD-10-PCS

## 2018-10-14 PROCEDURE — 3E0333Z INTRODUCTION OF ANTI-INFLAMMATORY INTO PERIPHERAL VEIN, PERCUTANEOUS APPROACH: ICD-10-PCS

## 2018-10-14 PROCEDURE — 3E023GC INTRODUCTION OF OTHER THERAPEUTIC SUBSTANCE INTO MUSCLE, PERCUTANEOUS APPROACH: ICD-10-PCS

## 2018-10-14 NOTE — PDOC
Attending Attestation





- Resident


Resident Name: Lucrecia Hernandez





- ED Attending Attestation


I have performed the following: I have examined & evaluated the patient, The 

case was reviewed & discussed with the resident, I agree w/resident's findings 

& plan, Exceptions are as noted





- HPI


HPI: 





10/14/18 09:17


53-year-old female with history of back pain, colectomy, breast cancer status 

post right-sided mastectomy, fibromyalgia, peripheral neuropathy presents with 

multiple complaints. Patient reports 2 weeks of an occipital squeezing-like 

headache with associated for photophobia, photophobia. Reports that the 

headache is constant and not relieved with medications. Stated the headache 

initially started intermittent but became persistent. Denies any prior history 

of headaches. Patient also reports that this is worsening her peripheral 

neuropathy including her chronic right shoulder paresthesias and pain, 

bilateral feet and left lower quadrant pain. Reports that these typically 

worsened 1 her bodies under stressors. Had one loose stool but no other 

findings. No nausea or vomiting. Patient also endorses developing yellow 

productive cough since yesterday. Unknown sick contacts. As a result endorses 

shortness of breath but denies chest pain. Because of these multiple symptoms, 

came to the ER for further evaluation.





- Physicial Exam


PE: 





10/14/18 09:22





GENERAL: Awake, alert, and fully oriented, in no acute distress


HEAD: No signs of trauma


EYES:EOMI, sclera anicteric, conjunctiva clear


ENT: Auricles normal inspection, hearing grossly normal, nares patent,


NECK: Normal ROM, supple,


LUNGS: Breath sounds equal, clear to auscultation bilaterally.  No wheezes, and 

no crackles. Coughing with wheezing, but no wheezing at rest.


HEART: Regular rate and rhythm, normal S1 and S2, no murmurs, rubs or gallops


ABDOMEN: Soft, TTP LLQ (reportedly chronic)  No guarding, no rebound.  No masses


EXTREMITIES: Normal range of motion, no edema. 


NEUROLOGICAL: Cranial nerves II through XII intact.  Normal speech. Equal 

strength in upper and lower extremities.


SKIN: Warm, Dry, normal turgor, no rashes or lesions noted.





- Medical Decision Making





10/14/18 09:23





 Vital Signs











Temp Pulse Resp BP Pulse Ox


 


 98.3 F   98 H  20   147/122 H  100 


 


 10/14/18 08:04  10/14/18 08:04  10/14/18 08:04  10/14/18 08:04  10/14/18 08:04








53-year-old female with multiple complaints.





1. Cough. R/o PNA, bronchitis. Obtain influenza swab. Labs including lactic acid

, blood culture. Chest xray.





2. Peripheral neuropathy: Acute worsen likely 2/2 cough and headache. Treat 

underlying symptoms. Pain control. LLQ pain likely part of this process and not 

acute. Will defer on imaging of the abdomen at this time.





3. Headache: Appears like migraine like. However, new onset headache. And given 

prior hx of cancer, will obtain a head CT to r/o masses. Treat headache and 

reassess.


10/14/18 12:51





Chest x-ray reviewed demonstrates no acute finding. Head CThas no acute 

findings. Influenza swab negative. Blood work reviewed with no acute findings 

are than a slightly elevated lactic acid which I suspect secondary to 

dehydration. Patient was given some IV fluids and some medications for the 

headache the patient feels significantly better. I suspect this may be tension-

like headache or potential viral sinusitis. The patient likely has some viral 

symptoms. It repeat lactic acid is negative, the patient and I feel comfortable 

sending the patient home. Return precautions given.


10/14/18 13:45





2nd lactic acid is negative.





**Heart Score/ECG Review


  ** #1


ECG reviewed & interpreted by me at: 08:20





10/14/18 08:34


NSR 92, TWI III, avF, V3-V6, no std/citlali, normal axis, kaiser lintervals,  

msec

## 2018-10-14 NOTE — PDOC
History of Present Illness





- General


Chief Complaint: Pain


Stated Complaint: PAIN


Time Seen by Provider: 10/14/18 07:56


History Source: Patient


Exam Limitations: No Limitations





- History of Present Illness


Initial Comments: 





10/14/18 08:12


53 year old woman with hx of subtotal colectomy, breast cancer R mastectomy 3 

years ago and fibromyalgia (on lyrica) , presents with 2 weeks of occipital 

headache that is described as throbbing and is worse in the AM and with bright 

lights, 2 days of nausea and LLQ abd pain, and cough productive of yellow/white 

phlegm and diarrhea that started this AM. Patient also reports that she has had 

muscle weakness in the bilateral thighs for 2 days and R axilla 

lymphadenopathy. She denies any recent contacts or recent travel, chest pain or 

any fevers at home. 





PMHX: as in HPI


Meds: see below


Allergies: Toradol, colchicine


Tob: none


Etoh: none


Rec drugs: none





PCP: Hank














Past History





- Past Medical History


Allergies/Adverse Reactions: 


 Allergies











Allergy/AdvReac Type Severity Reaction Status Date / Time


 


ketorolac tromethamine Allergy Intermediate Rash Verified 10/14/18 08:06





[From Toradol]     


 


colchicine Allergy   Verified 10/14/18 08:06











Home Medications: 


Ambulatory Orders





Venlafaxine HCl ER [Effexor Xr -] 150 mg PO DAILY 05/29/17 


Oxycodone HCl/Acetaminophen [Percocet 5-325 mg Tablet] 1 - 2 tab PO Q4H #20 

tablet MDD 12 03/26/18 


Acetaminophen [Tylenol] 325 mg PO BID #14 capsule 10/14/18 


Metoclopramide HCl [Reglan -] 10 mg PO TID #21 tablet 10/14/18 


Pregabalin [Lyrica -] 150 mg PO DAILY 10/14/18 








Cancer: Yes (rt breast,cervical ca)


COPD: No





- Surgical History


Abdominal Surgery:  (COLON SURGERY;SBO,MESENTERIC ISCHEMIA)





- Suicide/Smoking/Psychosocial Hx


Smoking Status: No


Smoking History: Never smoked


Have you smoked in the past 12 months: No


Number of Cigarettes Smoked Daily: 0


If you are a former smoker, when did you quit?: 20 years


Information on smoking cessation initiated: No


Hx Alcohol Use: No


Drug/Substance Use Hx: No


Substance Use Type: None


Hx Substance Use Treatment: No





**Review of Systems





- Review of Systems


Able to Perform ROS?: Yes


Is the patient limited English proficient: No


Constitutional: No: Chills, Diaphoresis, Fever


HEENTM: No: Blurred Vision, Double Vision, Tinnitus


Respiratory: Yes: See HPI, Cough, Shortness of Breath.  No: Wheezing


Cardiac (ROS): No: Chest Pain, Palpitations, Chest Tightness


ABD/GI: No: Constipated, Diarrhea, Nausea, Vomiting


: No: Burning, Dysuria, Hematuria


Musculoskeletal: No: Back Pain


Neurological: Yes: See HPI, Headache.  No: Numbness, Paresthesia, Tingling





*Physical Exam





- Vital Signs


 Last Vital Signs











Temp Pulse Resp BP Pulse Ox


 


 98.3 F   98 H  20   147/122 H  100 


 


 10/14/18 08:04  10/14/18 08:04  10/14/18 08:04  10/14/18 08:04  10/14/18 08:04














- Physical Exam


Comments: 





10/14/18 08:44


GENERAL: Awake, alert, and fully oriented, in no acute distress


HEAD: No signs of trauma, normocephalic, atraumatic 


EYES: PERRLA, EOMI, sclera anicteric, conjunctiva clear


ENT: Auricles normal inspection, hearing grossly normal, nares patent, 

oropharynx clear without


exudates. Moist mucosa


NECK: Normal ROM, supple, no lymphadenopathy, JVD, or masses


LUNGS: No distress, speaks full sentences, clear to auscultation bilaterally 


HEART: Regular rate and rhythm, normal S1 and S2, no murmurs, rubs or gallops, 

peripheral pulses normal and equal bilaterally. 


ABDOMEN: Soft, + LLQ tender to palpation, normoactive bowel sounds.  No guarding

, no rebound.  No masses


EXTREMITIES : Normal inspection, Normal range of motion, no edema.  No clubbing 

or cyanosis. + tender to touch on the R shoulder


NEUROLOGICAL: Cranial nerves II through XII grossly intact.  Normal speech, 

normal gait, no focal sensorimotor deficits 


SKIN: Warm, Dry, normal turgor, no rashes or lesions noted








ED Treatment Course





- LABORATORY


CBC & Chemistry Diagram: 


 10/14/18 08:40





 10/14/18 08:40





- RADIOLOGY


Radiology Studies Ordered: 














 Category Date Time Status


 


 CHEST X-RAY PORTABLE* [RAD] [facility]  10/14/18 08:09 Ordered














Medical Decision Making





- Medical Decision Making





53 year old woman with hx of subtotal colectomy, breast cancer R mastectomy 3 

years ago and fibromyalgia (on lyrica) , presents with 2 weeks of occipital 

headache that is described as throbbing and is worse in the AM, 2 days of 

nausea and LLQ abd pain, and cough productive of yellow/white phlegm and 

diarrhea that started this AM. Patient also reports that she has had muscle 

weakness in the bilateral thighs for 2 days and R axilla lymphadenopathy. She 

denies any recent contacts or recent travel, chest pain or any fevers at home. 





DDX including but not limited to: 


   Cough: PNA vs viral URI


   Headache: metastasis vs migraine


   Muscleache: influenza vs fibromyalgia





W/U: 


- cbc, cmp, blood cx, trop


- EKG


- CXR


- ua, ucx





TX:


- reglan


- tylenol


- 1L NS


- duoneb


- dexamethasone





ED Course: 


Patient evaluated short of breath and coughing at bedside. 


Duoneb started. 





10/14/18 11:01


UA negative


Lactic acid: 2.8 will repeat after NS completed





10/14/18 13:38


Repeat lactic acid: 1.7


Likely patient with initially elevated lactic acid 2/2 to hydration status. 





Patient advised to drink plenty of fluids. Given follow up to neurology for 

evaluation of headaches. 





Patient stable for discharge. Informed of all lab and imaging results.


Given follow up instructions and strict return precautions.


Patient expressed understanding and agrees to plan




















*DC/Admit/Observation/Transfer


Diagnosis at time of Disposition: 


 Headache, Viral syndrome








- Discharge Dispostion


Disposition: HOME


Condition at time of disposition: Stable


Decision to Admit order: No





- Prescriptions


Prescriptions: 


Acetaminophen [Tylenol] 325 mg PO BID #14 capsule


Metoclopramide HCl [Reglan -] 10 mg PO TID #21 tablet





- Referrals


Referrals: 


Lubna Mckinney MD [Primary Care Provider] - 


Philly Moncada MD [Staff Physician] - 





- Patient Instructions


Printed Discharge Instructions:  DI for Viral Syndrome, DI for Headache


Additional Instructions: 


You were seen in the ED for complaints of headache, cough, feeling unwell. 





In the ED you were evaluated with labwork and imaging.


Your results did not show any significant findings.


There does not appear to be an acute need for immediate hospitalization.





You are advised to follow up with your Primary Care Physician within 1 week.


You were given a referral to Neurology and are advised to follow up within 1 

week. 





You were given a prescription for pain relief medications. Please take as 

directed. 


Please be advised to rest and drink plenty of fluids.


Return to the ED immediately if you experience worsening headache, shortness of 

breath, cough, fever, nausea, vomiting, diarrhea, constipation, muscle weakness

, inability to walk, worsening abdominal pain, or changes in vision or hearing. 








- Post Discharge Activity


Forms/Work/School Notes:  Back to Work

## 2018-10-15 NOTE — EKG
Test Reason : 

Blood Pressure : ***/*** mmHG

Vent. Rate : 092 BPM     Atrial Rate : 092 BPM

   P-R Int : 120 ms          QRS Dur : 076 ms

    QT Int : 384 ms       P-R-T Axes : 061 009 -27 degrees

   QTc Int : 474 ms

 

NORMAL SINUS RHYTHM

SEPTAL INFARCT , AGE UNDETERMINED

ABNORMAL ECG

WHEN COMPARED WITH ECG OF 30-OCT-2017 22:15,

T WAVE VARIATION

Confirmed by CARLOS LOZANO MD ([id number]) on 10/15/2018 10:32:29 AM

 

Referred By:             Confirmed By:CARLOS LOZANO MD

## 2019-10-01 ENCOUNTER — HOSPITAL ENCOUNTER (EMERGENCY)
Dept: HOSPITAL 74 - JERFT | Age: 54
Discharge: HOME | End: 2019-10-01
Payer: COMMERCIAL

## 2019-10-01 VITALS — HEART RATE: 90 BPM | SYSTOLIC BLOOD PRESSURE: 136 MMHG | DIASTOLIC BLOOD PRESSURE: 98 MMHG | TEMPERATURE: 98.3 F

## 2019-10-01 VITALS — BODY MASS INDEX: 33.2 KG/M2

## 2019-10-01 DIAGNOSIS — Z85.41: ICD-10-CM

## 2019-10-01 DIAGNOSIS — Z88.8: ICD-10-CM

## 2019-10-01 DIAGNOSIS — Z88.6: ICD-10-CM

## 2019-10-01 DIAGNOSIS — M54.16: Primary | ICD-10-CM

## 2019-10-01 DIAGNOSIS — Z85.3: ICD-10-CM

## 2019-10-01 PROCEDURE — 3E033GC INTRODUCTION OF OTHER THERAPEUTIC SUBSTANCE INTO PERIPHERAL VEIN, PERCUTANEOUS APPROACH: ICD-10-PCS

## 2019-10-01 PROCEDURE — 3E033NZ INTRODUCTION OF ANALGESICS, HYPNOTICS, SEDATIVES INTO PERIPHERAL VEIN, PERCUTANEOUS APPROACH: ICD-10-PCS

## 2019-10-01 NOTE — PDOC
History of Present Illness





- General


Chief Complaint: Back Pain


Stated Complaint: PAIN


Time Seen by Provider: 10/01/19 13:03





- History of Present Illness


Initial Comments: 





10/01/19 13:14


55 y/o F with PMH of lumbar radiculopathy presents for evaluation of R leg 

radiculopathy and LBP increasing over the last month without any precipitating 

traumatic event unrelieved with neurontin at home. 





Past History





- Past Medical History


Allergies/Adverse Reactions: 


 Allergies











Allergy/AdvReac Type Severity Reaction Status Date / Time


 


ketorolac tromethamine Allergy Intermediate Rash Verified 10/01/19 11:57





[From Toradol]     


 


colchicine Allergy   Verified 10/01/19 11:57











Home Medications: 


Ambulatory Orders





Venlafaxine HCl ER [Effexor Xr -] 150 mg PO DAILY 05/29/17 


Oxycodone HCl/Acetaminophen [Percocet 5-325 mg Tablet] 1 - 2 tab PO Q4H #20 

tablet MDD 12 03/26/18 


Acetaminophen [Tylenol] 325 mg PO BID #14 capsule 10/14/18 


Metoclopramide HCl [Reglan -] 10 mg PO TID #21 tablet 10/14/18 


Pregabalin [Lyrica -] 150 mg PO DAILY 10/14/18 


Amoxicillin - [Amoxicillin 500mg Capsule -] 500 mg PO BID #14 capsule 10/17/18 


Acetaminophen Injection [Ofirmev Injection -] 1,000 mg IVPB ONCE #1 vial 10/01/

19 


Cyclobenzaprine HCl [Flexeril 10 mg] 10 mg PO HS PRN #10 tablet 10/01/19 


Hydrocortisone Sod Succinate [Solu-Cortef] 100 mg IVPB ONCE #1 vial 10/01/19 


Methylprednisolone [Medrol Dose Mark] 4 mg PO ASDIR #21 tablet 10/01/19 








Cancer: Yes (rt breast,cervical ca)


COPD: No


Other medical history: NEUROPATHY





- Surgical History


Abdominal Surgery:  (COLON SURGERY;SBO,MESENTERIC ISCHEMIA)





- Immunization History


Immunization Up to Date: Yes





- Psycho Social/Smoking Cessation Hx


Smoking Status: No


Smoking History: Never smoked


Have you smoked in the past 12 months: No


Number of Cigarettes Smoked Daily: 0


If you are a former smoker, when did you quit?: 20 years


Information on smoking cessation initiated: No


Hx Alcohol Use: No


Drug/Substance Use Hx: No


Substance Use Type: None


Hx Substance Use Treatment: No





**Review of Systems





- Review of Systems


Constitutional: No: Fever


: No: Incontinence


Musculoskeletal: Yes: Back Pain


Neurological: Yes: Tingling.  No: Numbness, Paresthesia





*Physical Exam





- Vital Signs


 Last Vital Signs











Temp Pulse Resp BP Pulse Ox


 


 98.3 F   90   18   136/98   100 


 


 10/01/19 11:50  10/01/19 11:50  10/01/19 11:50  10/01/19 11:50  10/01/19 11:50














- Physical Exam


Comments: 





10/01/19 13:18


There is decreased range of motion. 5 out of 5 strength in bilateral lower 

extremities.Straight leg raise test is negative bilaterally. Thighs and calves 

are soft and nontender. There are no gross sensory motor deficits. 

Neurovascularly intact.





+SLR Bilaterally 





Discharge





- Discharge Information


Problems reviewed: Yes


Clinical Impression/Diagnosis: 


 Lumbar radiculopathy





Condition: Stable


Disposition: HOME





- Admission


No





- Additional Discharge Information


Prescriptions: 


Acetaminophen Injection [Ofirmev Injection -] 1,000 mg IVPB ONCE #1 vial


Hydrocortisone Sod Succinate [Solu-Cortef] 100 mg IVPB ONCE #1 vial





- Follow up/Referral


Referrals: 


Lubna Mckinney MD [Primary Care Provider] - 


Jeromy Gross MD, FAANS [Staff Physician] - 





- Patient Discharge Instructions


Additional Instructions: 


Please start the muscle relaxer tonight and the steroid pack tomorrow as 

directed. Return to the emergency room should symptoms worsen and follow up 

with neuro surgery in 1-2 days for further evaluation and treatment options. 





- Post Discharge Activity

## 2019-11-11 ENCOUNTER — HOSPITAL ENCOUNTER (INPATIENT)
Dept: HOSPITAL 74 - JSAMEDAYSX | Age: 54
LOS: 3 days | Discharge: HOME HEALTH | DRG: 473 | End: 2019-11-14
Attending: INTERNAL MEDICINE | Admitting: INTERNAL MEDICINE
Payer: COMMERCIAL

## 2019-11-11 VITALS — BODY MASS INDEX: 34 KG/M2

## 2019-11-11 DIAGNOSIS — M47.12: Primary | ICD-10-CM

## 2019-11-11 DIAGNOSIS — F41.9: ICD-10-CM

## 2019-11-11 DIAGNOSIS — M40.202: ICD-10-CM

## 2019-11-11 DIAGNOSIS — R51: ICD-10-CM

## 2019-11-11 DIAGNOSIS — E66.8: ICD-10-CM

## 2019-11-11 DIAGNOSIS — I10: ICD-10-CM

## 2019-11-11 DIAGNOSIS — Z85.3: ICD-10-CM

## 2019-11-11 PROCEDURE — 0RB30ZZ EXCISION OF CERVICAL VERTEBRAL DISC, OPEN APPROACH: ICD-10-PCS | Performed by: NEUROLOGICAL SURGERY

## 2019-11-11 PROCEDURE — B01BZZZ FLUOROSCOPY OF SPINAL CORD: ICD-10-PCS | Performed by: NEUROLOGICAL SURGERY

## 2019-11-11 PROCEDURE — 0RG10A0 FUSION OF CERVICAL VERTEBRAL JOINT WITH INTERBODY FUSION DEVICE, ANTERIOR APPROACH, ANTERIOR COLUMN, OPEN APPROACH: ICD-10-PCS | Performed by: NEUROLOGICAL SURGERY

## 2019-11-11 PROCEDURE — 00NW0ZZ RELEASE CERVICAL SPINAL CORD, OPEN APPROACH: ICD-10-PCS | Performed by: NEUROLOGICAL SURGERY

## 2019-11-11 RX ADMIN — DOCUSATE SODIUM SCH MG: 100 CAPSULE, LIQUID FILLED ORAL at 21:43

## 2019-11-11 RX ADMIN — BENZOCAINE AND MENTHOL PRN EACH: 15; 3.6 LOZENGE ORAL at 23:11

## 2019-11-11 RX ADMIN — HEPARIN SODIUM SCH: 5000 INJECTION, SOLUTION INTRAVENOUS; SUBCUTANEOUS at 17:40

## 2019-11-11 RX ADMIN — SODIUM CHLORIDE, POTASSIUM CHLORIDE, SODIUM LACTATE AND CALCIUM CHLORIDE SCH: 600; 310; 30; 20 INJECTION, SOLUTION INTRAVENOUS at 13:49

## 2019-11-11 RX ADMIN — HEPARIN SODIUM SCH UNIT: 5000 INJECTION, SOLUTION INTRAVENOUS; SUBCUTANEOUS at 17:22

## 2019-11-11 RX ADMIN — CEFAZOLIN SCH MLS/HR: 1 INJECTION, POWDER, FOR SOLUTION INTRAVENOUS at 17:22

## 2019-11-11 RX ADMIN — DOCUSATE SODIUM SCH MG: 100 CAPSULE, LIQUID FILLED ORAL at 14:05

## 2019-11-11 NOTE — OP
Operative Note





- Note:


Operative Date: 11/11/19


Pre-Operative Diagnosis: C5/6 spondylosis


Operation: C5 Caudal hemicorpectomy, C6 rostral hemicorpectomy with 

decompression of spondylosis and reconstruction with PEEK cage and anterior 

plate


Post-Operative Diagnosis: Same as Pre-op


Surgeon: Jeromy Gross


Assistant: Sam Dowell


Anesthesiologist/CRNA: Terrance Pete


Anesthesia: General


Estimated Blood Loss (mls): 20


Operative Report Dictated: Yes

## 2019-11-11 NOTE — HP
History & Physical Update





- History


History: No Change





- Physical


Physical: No Change





- Assessment


Assessment: No Change





- Plan


Plan: No Change (Full H&P in paper chart from 10/28/19, by Lubna Mckinney MD)

## 2019-11-12 LAB
ANION GAP SERPL CALC-SCNC: 8 MMOL/L (ref 8–16)
BUN SERPL-MCNC: 9.6 MG/DL (ref 7–18)
CALCIUM SERPL-MCNC: 8.9 MG/DL (ref 8.5–10.1)
CHLORIDE SERPL-SCNC: 102 MMOL/L (ref 98–107)
CO2 SERPL-SCNC: 26 MMOL/L (ref 21–32)
CREAT SERPL-MCNC: 0.7 MG/DL (ref 0.55–1.3)
DEPRECATED RDW RBC AUTO: 13.5 % (ref 11.6–15.6)
GLUCOSE SERPL-MCNC: 94 MG/DL (ref 74–106)
HCT VFR BLD CALC: 34.9 % (ref 32.4–45.2)
HGB BLD-MCNC: 11.9 GM/DL (ref 10.7–15.3)
MCH RBC QN AUTO: 30.8 PG (ref 25.7–33.7)
MCHC RBC AUTO-ENTMCNC: 34 G/DL (ref 32–36)
MCV RBC: 90.4 FL (ref 80–96)
PLATELET # BLD AUTO: 241 K/MM3 (ref 134–434)
PMV BLD: 8.9 FL (ref 7.5–11.1)
POTASSIUM SERPLBLD-SCNC: 3.7 MMOL/L (ref 3.5–5.1)
RBC # BLD AUTO: 3.86 M/MM3 (ref 3.6–5.2)
SODIUM SERPL-SCNC: 136 MMOL/L (ref 136–145)
WBC # BLD AUTO: 9.1 K/MM3 (ref 4–10)

## 2019-11-12 RX ADMIN — FOLIC ACID SCH MG: 1 TABLET ORAL at 10:39

## 2019-11-12 RX ADMIN — VENLAFAXINE HYDROCHLORIDE SCH MG: 75 CAPSULE, EXTENDED RELEASE ORAL at 10:40

## 2019-11-12 RX ADMIN — HEPARIN SODIUM SCH: 5000 INJECTION, SOLUTION INTRAVENOUS; SUBCUTANEOUS at 03:02

## 2019-11-12 RX ADMIN — DOCUSATE SODIUM SCH MG: 100 CAPSULE, LIQUID FILLED ORAL at 21:38

## 2019-11-12 RX ADMIN — DOCUSATE SODIUM SCH MG: 100 CAPSULE, LIQUID FILLED ORAL at 05:22

## 2019-11-12 RX ADMIN — HEPARIN SODIUM SCH UNIT: 5000 INJECTION, SOLUTION INTRAVENOUS; SUBCUTANEOUS at 10:40

## 2019-11-12 RX ADMIN — CEFAZOLIN SCH MLS/HR: 1 INJECTION, POWDER, FOR SOLUTION INTRAVENOUS at 10:38

## 2019-11-12 RX ADMIN — SODIUM CHLORIDE, POTASSIUM CHLORIDE, SODIUM LACTATE AND CALCIUM CHLORIDE SCH: 600; 310; 30; 20 INJECTION, SOLUTION INTRAVENOUS at 10:56

## 2019-11-12 RX ADMIN — BENZOCAINE AND MENTHOL PRN EACH: 15; 3.6 LOZENGE ORAL at 05:22

## 2019-11-12 RX ADMIN — HEPARIN SODIUM SCH UNIT: 5000 INJECTION, SOLUTION INTRAVENOUS; SUBCUTANEOUS at 17:16

## 2019-11-12 RX ADMIN — CEFAZOLIN SCH MLS/HR: 1 INJECTION, POWDER, FOR SOLUTION INTRAVENOUS at 03:02

## 2019-11-12 RX ADMIN — FERROUS SULFATE TAB EC 324 MG (65 MG FE EQUIVALENT) SCH MG: 324 (65 FE) TABLET DELAYED RESPONSE at 10:39

## 2019-11-12 RX ADMIN — PREGABALIN SCH MG: 75 CAPSULE ORAL at 10:39

## 2019-11-12 RX ADMIN — DOCUSATE SODIUM SCH MG: 100 CAPSULE, LIQUID FILLED ORAL at 13:33

## 2019-11-12 RX ADMIN — BENZOCAINE AND MENTHOL PRN EACH: 15; 3.6 LOZENGE ORAL at 17:20

## 2019-11-12 NOTE — PROC
Procedure Note


Procedure: 





POD#1 ACDF doing well





HIWOT drain removed with tip fully intact. drain ostomy clean and dry with no 

active d/c.


Incision c/d/i with surrounding tissue intact with no tracking erythema, edema 

or evidence of collection or d/c. trachea midline.





The patient tolerated the procedure well.

## 2019-11-12 NOTE — PN
Progress Note (short form)





- Note


Progress Note: 





MEDICINE





Pt examined


POD#1


has pain in throat and neck


Examined with surgical PA


Drain removed


c/o anxiety and insomnia





 Vital Signs - 24 hr











  11/11/19 11/11/19 11/11/19





  10:45 11:00 11:15


 


Temperature   


 


Pulse Rate 92 H 83 88


 


Respiratory 18 16 16





Rate   


 


Blood Pressure 157/100 156/112 H 168/98


 


O2 Sat by Pulse 99 99 99





Oximetry (%)   














  11/11/19 11/11/19 11/11/19





  11:30 11:45 12:00


 


Temperature   


 


Pulse Rate 88 80 84


 


Respiratory 17 17 15





Rate   


 


Blood Pressure 164/114 H 160/115 H 152/90


 


O2 Sat by Pulse 99 99 96





Oximetry (%)   














  11/11/19 11/11/19 11/11/19





  12:15 12:30 12:50


 


Temperature   


 


Pulse Rate 87 88 86


 


Respiratory 17 17 18





Rate   


 


Blood Pressure 146/97 144/99 149/90


 


O2 Sat by Pulse 95 95 96





Oximetry (%)   














  11/11/19 11/11/19 11/11/19





  13:00 13:10 13:30


 


Temperature   


 


Pulse Rate 88 84 84


 


Respiratory 18 20 20





Rate   


 


Blood Pressure 144/99 150/98 149/80


 


O2 Sat by Pulse 96 96 95





Oximetry (%)   














  11/11/19 11/11/19 11/11/19





  13:35 13:56 14:30


 


Temperature 97.8 F  97.5 F L


 


Pulse Rate 88  96 H


 


Respiratory 18  20





Rate   


 


Blood Pressure 151/98  151/100


 


O2 Sat by Pulse 98 98 98





Oximetry (%)   














  11/11/19 11/11/19 11/12/19





  14:33 16:25 02:00


 


Temperature 97.5 F L 98.2 F 96.8 F L


 


Pulse Rate 96 H 93 H 87


 


Respiratory 18 20 20





Rate   


 


Blood Pressure 151/106 H 151/91 142/89


 


O2 Sat by Pulse   





Oximetry (%)   














  11/12/19





  06:00


 


Temperature 98.4 F


 


Pulse Rate 85


 


Respiratory 20





Rate 


 


Blood Pressure 141/82


 


O2 Sat by Pulse 





Oximetry (%) 








 Current Medications











Generic Name Dose Route Start Last Admin





  Trade Name Freq  PRN Reason Stop Dose Admin


 


Benzocaine/Menthol  1 each  11/11/19 22:56  11/12/19 05:22





  Cepacol Lozenge -  MM   1 each





  PRN PRN   Administration





  SORE THROAT   





     





     





     


 


Diphenhydramine HCl  25 mg  11/11/19 10:33  





  Benadryl -  PO   





  Q6H PRN   





  FOR ITCHING   





     





     





     


 


Docusate Sodium  100 mg  11/11/19 14:00  11/12/19 05:22





  Colace -  PO   100 mg





  TID [facility]   Administration





     





     





     





     


 


Fentanyl  50 mcg  11/11/19 11:25  





  Sublimaze Injection -  IVPUSH   





  [id number] PRN   





  PAIN-PACU ORDER X 4 DOSES ONLY   





     





     





     


 


Ferrous Sulfate  325 mg  11/12/19 10:00  





  Feosol -  PO   





  DAILY [facility]   





     





     





     





     


 


Folic Acid  1 mg  11/12/19 10:00  





  Folic Acid -  PO   





  DAILY [facility]   





     





     





     





     


 


Heparin Sodium (Porcine)  5,000 unit  11/11/19 18:00  11/12/19 03:02





  Heparin -  SQ   Not Given





  Q8H-IV [facility]   





     





     





     





     


 


Hydromorphone HCl  2 mg  11/11/19 11:26  11/11/19 23:34





  Dilaudid -  PO   2 mg





  Q4HWA PRN   Administration





  PAIN LEVEL 4 - 6   





     





     





     


 


Hydromorphone HCl  4 mg  11/11/19 11:26  11/12/19 05:22





  Dilaudid -  PO   4 mg





  Q4H PRN   Administration





  PAIN LEVEL 7 - 10   





     





     





     


 


Cefazolin Sodium 1 gm/  50 mls @ 100 mls/hr  11/11/19 18:00  11/12/19 03:02





  Dextrose  IVPB  11/12/19 17:59  100 mls/hr





  Q8H-IV [facility]   Administration





     





     





     





     


 


Lactated Ringer's  1,000 ml in 1,000 mls @ 125 mls/hr  11/11/19 10:45  11/11/19 

13:49





  Lactated Ringers Solution  IV   Not Given





  ASDIR [facility]   





     





     





     





     


 


Ondansetron HCl  4 mg  11/11/19 10:33  





  Zofran Injection  IVPUSH   





  Q6H PRN   





  NAUSEA   





     





     





     


 


Pregabalin  150 mg  11/12/19 10:00  





  Lyrica -  PO   





  DAILY [facility]   





     





     





     





     


 


Venlafaxine HCl  150 mg  11/12/19 10:00  





  Effexor Xr -  PO   





  DAILY [facility]   





     





     





     





     








 Laboratory Results - last 24 hr











  11/12/19 11/12/19





  07:38 07:38


 


WBC  9.1 


 


RBC  3.86 


 


Hgb  11.9 


 


Hct  34.9  D 


 


MCV  90.4 


 


MCH  30.8 


 


MCHC  34.0 


 


RDW  13.5 


 


Plt Count  241 


 


MPV  8.9 


 


Sodium   136


 


Potassium   3.7


 


Chloride   102


 


Carbon Dioxide   26


 


Anion Gap   8


 


BUN   9.6


 


Creatinine   0.7


 


Est GFR (CKD-EPI)AfAm   114.65


 


Est GFR (CKD-EPI)NonAf   98.92


 


Random Glucose   94


 


Calcium   8.9








S1 S2 RRR


Lungs clear


Abd- soft, NT


No edema


B/l SCD








PLAN


pain control


Post op antibiotics


iv fluids


 add Klonopin for anxiety and Ambien as needed for sleep


OOB daily 


dc planning for tomorrow if ok with surgery 








Problem List





- Problems


(1) Cervical spondylosis


Code(s): M47.812 - SPONDYLOSIS W/O MYELOPATHY OR RADICULOPATHY, CERVICAL REGION

   





(2) Anxiety


Code(s): F41.9 - ANXIETY DISORDER, UNSPECIFIED   





(3) Breast cancer


Code(s): C50.919 - MALIGNANT NEOPLASM OF UNSP SITE OF UNSPECIFIED FEMALE BREAST

## 2019-11-13 LAB
ALBUMIN SERPL-MCNC: 3.2 G/DL (ref 3.4–5)
ALP SERPL-CCNC: 92 U/L (ref 45–117)
ALT SERPL-CCNC: 20 U/L (ref 13–61)
ANION GAP SERPL CALC-SCNC: 5 MMOL/L (ref 8–16)
AST SERPL-CCNC: 20 U/L (ref 15–37)
BASOPHILS # BLD: 0.9 % (ref 0–2)
BILIRUB SERPL-MCNC: 0.8 MG/DL (ref 0.2–1)
BUN SERPL-MCNC: 9 MG/DL (ref 7–18)
CALCIUM SERPL-MCNC: 8.8 MG/DL (ref 8.5–10.1)
CHLORIDE SERPL-SCNC: 99 MMOL/L (ref 98–107)
CO2 SERPL-SCNC: 30 MMOL/L (ref 21–32)
CREAT SERPL-MCNC: 0.6 MG/DL (ref 0.55–1.3)
DEPRECATED RDW RBC AUTO: 13.5 % (ref 11.6–15.6)
EOSINOPHIL # BLD: 4.5 % (ref 0–4.5)
GLUCOSE SERPL-MCNC: 89 MG/DL (ref 74–106)
HCT VFR BLD CALC: 34.3 % (ref 32.4–45.2)
HGB BLD-MCNC: 11.6 GM/DL (ref 10.7–15.3)
LYMPHOCYTES # BLD: 28.9 % (ref 8–40)
MCH RBC QN AUTO: 30.7 PG (ref 25.7–33.7)
MCHC RBC AUTO-ENTMCNC: 33.9 G/DL (ref 32–36)
MCV RBC: 90.5 FL (ref 80–96)
MONOCYTES # BLD AUTO: 7.1 % (ref 3.8–10.2)
NEUTROPHILS # BLD: 58.6 % (ref 42.8–82.8)
PLATELET # BLD AUTO: 236 K/MM3 (ref 134–434)
PMV BLD: 8.9 FL (ref 7.5–11.1)
POTASSIUM SERPLBLD-SCNC: 3.6 MMOL/L (ref 3.5–5.1)
PROT SERPL-MCNC: 6.6 G/DL (ref 6.4–8.2)
RBC # BLD AUTO: 3.79 M/MM3 (ref 3.6–5.2)
SODIUM SERPL-SCNC: 135 MMOL/L (ref 136–145)
WBC # BLD AUTO: 8.4 K/MM3 (ref 4–10)

## 2019-11-13 RX ADMIN — DOCUSATE SODIUM SCH MG: 100 CAPSULE, LIQUID FILLED ORAL at 05:56

## 2019-11-13 RX ADMIN — HEPARIN SODIUM SCH: 5000 INJECTION, SOLUTION INTRAVENOUS; SUBCUTANEOUS at 18:06

## 2019-11-13 RX ADMIN — AMLODIPINE BESYLATE SCH MG: 5 TABLET ORAL at 11:41

## 2019-11-13 RX ADMIN — PREGABALIN SCH MG: 75 CAPSULE ORAL at 11:41

## 2019-11-13 RX ADMIN — HEPARIN SODIUM SCH UNIT: 5000 INJECTION, SOLUTION INTRAVENOUS; SUBCUTANEOUS at 01:09

## 2019-11-13 RX ADMIN — HEPARIN SODIUM SCH: 5000 INJECTION, SOLUTION INTRAVENOUS; SUBCUTANEOUS at 11:48

## 2019-11-13 RX ADMIN — VENLAFAXINE HYDROCHLORIDE SCH MG: 75 CAPSULE, EXTENDED RELEASE ORAL at 11:41

## 2019-11-13 RX ADMIN — DOCUSATE SODIUM SCH MG: 100 CAPSULE, LIQUID FILLED ORAL at 22:16

## 2019-11-13 RX ADMIN — HEPARIN SODIUM SCH UNIT: 5000 INJECTION, SOLUTION INTRAVENOUS; SUBCUTANEOUS at 11:42

## 2019-11-13 RX ADMIN — FERROUS SULFATE TAB EC 324 MG (65 MG FE EQUIVALENT) SCH MG: 324 (65 FE) TABLET DELAYED RESPONSE at 11:41

## 2019-11-13 RX ADMIN — SODIUM CHLORIDE, POTASSIUM CHLORIDE, SODIUM LACTATE AND CALCIUM CHLORIDE SCH: 600; 310; 30; 20 INJECTION, SOLUTION INTRAVENOUS at 11:43

## 2019-11-13 RX ADMIN — DOCUSATE SODIUM SCH MG: 100 CAPSULE, LIQUID FILLED ORAL at 16:19

## 2019-11-13 RX ADMIN — FOLIC ACID SCH MG: 1 TABLET ORAL at 11:42

## 2019-11-13 NOTE — DS
Physical Examination


Vital Signs: 


 Vital Signs











Temperature  98.7 F   11/13/19 06:51


 


Pulse Rate  98 H  11/13/19 06:51


 


Respiratory Rate  20   11/13/19 06:51


 


Blood Pressure  144/97   11/13/19 06:51


 


O2 Sat by Pulse Oximetry (%)  99   11/12/19 09:00











Constitutional: Yes: No Distress, Calm


Cardiovascular: Yes: Regular Rate and Rhythm


Respiratory: Yes: CTA Bilaterally


Gastrointestinal: Yes: Normal Bowel Sounds, Soft, Abdomen, Obese.  No: 

Tenderness


Edema: No


Labs: 


 CBC, BMP





 11/13/19 08:10 





 11/13/19 08:10 











Discharge Summary


Problems reviewed: Yes


Reason For Visit: CERVICAL SPONDYLOSIS


Current Active Problems





Cervical spondylosis (Acute)








Other Procedures: - Note:  Operative Date: 11/11/19.  Pre-Operative Diagnosis: 

C5/6 spondylosis.  Operation: C5 Caudal hemicorpectomy, C6 rostral 

hemicorpectomy with decompression of spondylosis and reconstruction with PEEK 

cage and anterior plate


Hospital Course: 


Pt underwent elective cervical spine surgery 


- Note:


Operative Date: 11/11/19


Pre-Operative Diagnosis: C5/6 spondylosis


Operation: C5 Caudal hemicorpectomy, C6 rostral hemicorpectomy with 

decompression of spondylosis and reconstruction with PEEK cage and anterior 

plate





post op uneventful


Needs to be compliant on HTN meds


Continue with HTN meds, pain control


stable for dc home 


Condition: Stable





- Instructions


Diet, Activity, Other Instructions: 


Dr. Gross's Post Operative Instructions





Physical Activity


Resume your normal everyday activity as tolerated. No heavy lifting or exercise 

until seen by your surgeon. You may walk 


unlimited amounts and climb stairs. You may resume driving the car when you 

feel safe and comfortable behind the


wheel and you are no longer wearing your brace. Do not operate a vehicle while 

taking narcotic medication.





Brace 


If you had neck surgery, wear surgical collar 23 hr/day. Remove to shower only.





Wound Care


Keep your incision clean, dry and covered at all times. Apply an occlusive 

dressing (Saran wrap or Tegaderm) when showering


to avoid getting your incision wet. Do not submerge incision or apply ointments 

or creams





Diet


There are no dietary restrictions. Eat healthy, high-fiber foods. Drink 6-8 

glasses of liquid each day. This will assist


in keeping your bowels regular.





Pain Management


You may take Tylenol or acetaminophen. Any pain prescription medication ordered 

should be taken as prescribed for moderate to


severe pain. Avoid any ibuprofen (Motrin, Advil, Aleve, Toradol, etc) for 3 

months unless otherwise discussed with your surgeon. 


Do not drive, drink alcohol or operate any heavy machinery while taking 

narcotic pain medications.





Call Dr He for any of the following:


Severe pain not relieved by medication


Fever of 101 or higher


Excessive bleeding or drainage on dressing


Inability to urinate





ISTOP: This report was requested by: Isabella Ardon | Reference #: [id number] 





Any chest pain or shortness of breath, seek Emergency Care.





Call the office to confirm a post-operative appointment for 2-3 weeks post-op





Jeromy Gross MD


[facility]


[address] [place] [address]. [place]


[place] [address]


[phone]





Referrals: 


Lubna Mckinney MD [Staff Physician] - 


Disposition: HOME





- Home Medications


Comprehensive Discharge Medication List: 


Ambulatory Orders





Venlafaxine HCl ER [Effexor Xr -] 150 mg PO DAILY 05/29/17 


Pregabalin [Lyrica -] 150 mg PO DAILY 10/14/18 


Diazepam [Valium] 2 mg PO TID PRN #24 tablet MDD 3 11/13/19 


Oxycodone HCl/Acetaminophen [Percocet 5-325 mg Tablet] 1 - 2 tab PO PRN PRN #26 

tablet MDD 10 11/13/19

## 2019-11-13 NOTE — PN
Progress Note (short form)





- Note


Progress Note: 





NEUROSURGERY





Pod #2





Alert. 


HOB at 30 degrees. Patient wearing her c-collar as instructed.


C/o posterior neck pain (muscular in nature). States she didn't sleep well 2/2 

discomfort. Incisional tenderness.


She has gotten oob and ambulated to bathroom without assistance. Voiding 

spontaneously.


Denies n/v/f/c, CP, palpitations, SOB, [place] or UE numbness/tingling








Gen: alert. 


Neck: dressing c/d/i. No hematoma. HIWOT serous


Neuro: GMNVI bilat














Problem List





- Problems


(1) Cervical spondylosis


Assessment/Plan: 


POD #2 s/p C5 Caudal hemicorpectomy, C6 rostral hemicorpectomy with 

decompression of spondylosis and reconstruction with PEEK cage and anterior 

plate





Cont using your Incentive spirometer


Wear your c-collar 23/24 hrs/day (may remove while eating)


OOB and ambulate


Diet as tolerated


Per Dr. Gross, patient cleared for DC home from surgery standoint.


Post-op f/u detailed in discharge plan.


Code(s): M47.812 - SPONDYLOSIS W/O MYELOPATHY OR RADICULOPATHY, CERVICAL REGION

   





(2) Anxiety


Code(s): F41.9 - ANXIETY DISORDER, UNSPECIFIED   





(3) Headache


Code(s): R51 - HEADACHE

## 2019-11-14 VITALS — DIASTOLIC BLOOD PRESSURE: 77 MMHG | HEART RATE: 93 BPM | SYSTOLIC BLOOD PRESSURE: 141 MMHG | TEMPERATURE: 97.9 F

## 2019-11-14 RX ADMIN — BENZOCAINE AND MENTHOL PRN EACH: 15; 3.6 LOZENGE ORAL at 15:13

## 2019-11-14 RX ADMIN — PREGABALIN SCH MG: 75 CAPSULE ORAL at 09:36

## 2019-11-14 RX ADMIN — FOLIC ACID SCH MG: 1 TABLET ORAL at 09:39

## 2019-11-14 RX ADMIN — HEPARIN SODIUM SCH: 5000 INJECTION, SOLUTION INTRAVENOUS; SUBCUTANEOUS at 09:39

## 2019-11-14 RX ADMIN — AMLODIPINE BESYLATE SCH MG: 5 TABLET ORAL at 09:38

## 2019-11-14 RX ADMIN — DOCUSATE SODIUM SCH MG: 100 CAPSULE, LIQUID FILLED ORAL at 06:16

## 2019-11-14 RX ADMIN — HEPARIN SODIUM SCH: 5000 INJECTION, SOLUTION INTRAVENOUS; SUBCUTANEOUS at 01:28

## 2019-11-14 RX ADMIN — DOCUSATE SODIUM SCH MG: 100 CAPSULE, LIQUID FILLED ORAL at 14:56

## 2019-11-14 RX ADMIN — FERROUS SULFATE TAB EC 324 MG (65 MG FE EQUIVALENT) SCH MG: 324 (65 FE) TABLET DELAYED RESPONSE at 09:40

## 2019-11-14 RX ADMIN — SODIUM CHLORIDE, POTASSIUM CHLORIDE, SODIUM LACTATE AND CALCIUM CHLORIDE SCH: 600; 310; 30; 20 INJECTION, SOLUTION INTRAVENOUS at 10:47

## 2019-11-14 RX ADMIN — VENLAFAXINE HYDROCHLORIDE SCH MG: 75 CAPSULE, EXTENDED RELEASE ORAL at 09:38

## 2019-11-14 NOTE — PN
Progress Note (short form)





- Note


Progress Note: 





MEDICINE





Pt examined


POD#2 


dc held yesterday as her BP was high 


 Vital Signs - 24 hr











  11/13/19 11/13/19 11/13/19





  15:00 15:30 16:30


 


Temperature 98.5 F  97.9 F


 


Pulse Rate 100 H  100 H


 


Respiratory 20  20





Rate   


 


Blood Pressure 155/94 155/98 150/90


 


O2 Sat by Pulse   





Oximetry (%)   














  11/13/19 11/13/19 11/13/19





  17:00 19:05 21:00


 


Temperature  98.4 F 


 


Pulse Rate 102 H 105 H 


 


Respiratory 18 20 





Rate   


 


Blood Pressure 150/110 H 128/110 H 


 


O2 Sat by Pulse   98





Oximetry (%)   














  11/13/19 11/14/19 11/14/19





  23:00 02:10 06:40


 


Temperature 98.2 F 98.6 F 98.5 F


 


Pulse Rate 103 H 103 H 99 H


 


Respiratory 18 20 20





Rate   


 


Blood Pressure 145/90 145/94 149/88


 


O2 Sat by Pulse   





Oximetry (%)   














  11/14/19 11/14/19





  09:14 10:54


 


Temperature 98.5 F 


 


Pulse Rate 108 H 


 


Respiratory 18 





Rate  


 


Blood Pressure 134/92 119/88


 


O2 Sat by Pulse  





Oximetry (%)  








 Current Medications











Generic Name Dose Route Start Last Admin





  Trade Name Freq  PRN Reason Stop Dose Admin


 


Amlodipine Besylate  10 mg  11/14/19 10:36  





  Norvasc -  PO   





  DAILY [facility]   





     





     





     





     


 


Benzocaine/Menthol  1 each  11/11/19 22:56  11/12/19 17:20





  Cepacol Lozenge -  MM   1 each





  PRN PRN   Administration





  SORE THROAT   





     





     





     


 


Clonazepam  0.5 mg  11/12/19 10:43  





  Klonopin -  PO   





  Q6H PRN   





  ANXIETY   





     





     





     


 


Diphenhydramine HCl  25 mg  11/11/19 10:33  





  Benadryl -  PO   





  Q6H PRN   





  FOR ITCHING   





     





     





     


 


Docusate Sodium  100 mg  11/11/19 14:00  11/14/19 06:16





  Colace -  PO   100 mg





  TID [facility]   Administration





     





     





     





     


 


Fentanyl  50 mcg  11/11/19 11:25  





  Sublimaze Injection -  IVPUSH   





  [id number] PRN   





  PAIN-PACU ORDER X 4 DOSES ONLY   





     





     





     


 


Ferrous Sulfate  325 mg  11/12/19 10:00  11/14/19 09:40





  Feosol -  PO   325 mg





  DAILY [facility]   Administration





     





     





     





     


 


Folic Acid  1 mg  11/12/19 10:00  11/14/19 09:39





  Folic Acid -  PO   1 mg





  DAILY [facility]   Administration





     





     





     





     


 


Heparin Sodium (Porcine)  5,000 unit  11/11/19 18:00  11/14/19 09:39





  Heparin -  SQ   Not Given





  Q8H-IV [facility]   





     





     





     





     


 


Lactated Ringer's  1,000 ml in 1,000 mls @ 125 mls/hr  11/11/19 10:45  11/14/19 

10:47





  Lactated Ringers Solution  IV   Not Given





  ASDIR [facility]   





     





     





     





     


 


Ondansetron HCl  4 mg  11/11/19 10:33  





  Zofran Injection  IVPUSH   





  Q6H PRN   





  NAUSEA   





     





     





     


 


Pregabalin  150 mg  11/12/19 10:00  11/14/19 09:36





  Lyrica -  PO   150 mg





  DAILY [facility]   Administration





     





     





     





     


 


Venlafaxine HCl  150 mg  11/12/19 10:00  11/14/19 09:38





  Effexor Xr -  PO   150 mg





  DAILY [facility]   Administration





     





     





     





     


 


Zolpidem Tartrate  5 mg  11/12/19 22:00  11/13/19 22:19





  Ambien -  PO   5 mg





  HS PRN   Administration





  INSOMNIA   





     





     





     








 Laboratory Results - last 24 hr











  11/13/19 11/13/19





  04:30 04:30


 


Hep Bs Antibody  Reactive 


 


Hep Bs Antibody, Quant  265.6 


 


Hep B Core Ab Interpret  Negative 


 


Hep C Ab Diagnostic   <0.1











S1 S2 RRR


Lungs clear


Abd- soft, NT


No edema


B/l SCD








PLAN


may be dc home on PO norvasc 10mg 


pain control


follow up in office in 2 weeks





Problem List





- Problems


(1) Cervical spondylosis


Code(s): M47.812 - SPONDYLOSIS W/O MYELOPATHY OR RADICULOPATHY, CERVICAL REGION

   





(2) Anxiety


Code(s): F41.9 - ANXIETY DISORDER, UNSPECIFIED   





(3) Breast cancer


Code(s): C50.919 - MALIGNANT NEOPLASM OF UNSP SITE OF UNSPECIFIED FEMALE BREAST

## 2019-11-15 NOTE — SURG
Surgery First Assist Note


First Assist: Sam Dowell PA-C


Date of Service: 11/11/19


Diagnosis: 


Cervical Spondylotic myelopathy with kyphosis


Procedure: 


1. Interbody Cage


2. C5 Caudal Hemicorpectomy with resection of osteophytes and posterior 

longitudinal ligament


3. C6 Rostral Hemicorpectomy with resection of osteophytes and posterior 

longitudinal ligament


4. Anterior instrumentation C5-C6 (technically challenging)


5. Fluoroscopy


6. Microdissection


7. C5/6 Arthrodesis


8. Local autograft


9. Deformity Correction (restoration of lordosis)








I was present for the entirety of the operative procedure. For further detail, 

please refer to operative report.








Visit type





- Case Type


Case Type: Scheduled





- Emergency


Emergency Visit: No





- New patient


This patient is new to me today: Yes


Date on this admission: 11/15/19





- Critical Care


Critical Care patient: No

## 2020-03-06 ENCOUNTER — HOSPITAL ENCOUNTER (EMERGENCY)
Dept: HOSPITAL 74 - JER | Age: 55
Discharge: ANOTHER HOSPITAL | End: 2020-03-06
Payer: COMMERCIAL

## 2020-03-06 VITALS — TEMPERATURE: 98.9 F | SYSTOLIC BLOOD PRESSURE: 118 MMHG | DIASTOLIC BLOOD PRESSURE: 85 MMHG | HEART RATE: 78 BPM

## 2020-03-06 VITALS — BODY MASS INDEX: 32.1 KG/M2

## 2020-03-06 DIAGNOSIS — Z88.6: ICD-10-CM

## 2020-03-06 DIAGNOSIS — K56.609: ICD-10-CM

## 2020-03-06 DIAGNOSIS — Z88.8: ICD-10-CM

## 2020-03-06 DIAGNOSIS — R10.32: Primary | ICD-10-CM

## 2020-03-06 DIAGNOSIS — Z85.3: ICD-10-CM

## 2020-03-06 DIAGNOSIS — Z87.891: ICD-10-CM

## 2020-03-06 LAB
ALBUMIN SERPL-MCNC: 4.1 G/DL (ref 3.4–5)
ALP SERPL-CCNC: 119 U/L (ref 45–117)
ALT SERPL-CCNC: 37 U/L (ref 13–61)
ANION GAP SERPL CALC-SCNC: 11 MMOL/L (ref 8–16)
APTT BLD: 37.6 SECONDS (ref 25.2–36.5)
AST SERPL-CCNC: 27 U/L (ref 15–37)
BASOPHILS # BLD: 1.5 % (ref 0–2)
BILIRUB SERPL-MCNC: 1 MG/DL (ref 0.2–1)
BUN SERPL-MCNC: 12.1 MG/DL (ref 7–18)
CALCIUM SERPL-MCNC: 10.1 MG/DL (ref 8.5–10.1)
CHLORIDE SERPL-SCNC: 107 MMOL/L (ref 98–107)
CO2 SERPL-SCNC: 23 MMOL/L (ref 21–32)
CREAT SERPL-MCNC: 0.9 MG/DL (ref 0.55–1.3)
DEPRECATED RDW RBC AUTO: 14 % (ref 11.6–15.6)
EOSINOPHIL # BLD: 1.7 % (ref 0–4.5)
GLUCOSE SERPL-MCNC: 91 MG/DL (ref 74–106)
HCT VFR BLD CALC: 41.8 % (ref 32.4–45.2)
HGB BLD-MCNC: 14.3 GM/DL (ref 10.7–15.3)
INR BLD: 1.18 (ref 0.83–1.09)
LIPASE SERPL-CCNC: 174 U/L (ref 73–393)
LYMPHOCYTES # BLD: 40.5 % (ref 8–40)
MAGNESIUM SERPL-MCNC: 1.7 MG/DL (ref 1.8–2.4)
MCH RBC QN AUTO: 29.8 PG (ref 25.7–33.7)
MCHC RBC AUTO-ENTMCNC: 34.2 G/DL (ref 32–36)
MCV RBC: 87.3 FL (ref 80–96)
MONOCYTES # BLD AUTO: 6 % (ref 3.8–10.2)
NEUTROPHILS # BLD: 50.3 % (ref 42.8–82.8)
PHOSPHATE SERPL-MCNC: 1.3 MG/DL (ref 2.5–4.9)
PLATELET # BLD AUTO: 314 K/MM3 (ref 134–434)
PMV BLD: 9.4 FL (ref 7.5–11.1)
POTASSIUM SERPLBLD-SCNC: 3.4 MMOL/L (ref 3.5–5.1)
PROT SERPL-MCNC: 8.2 G/DL (ref 6.4–8.2)
PT PNL PPP: 14 SEC (ref 9.7–13)
RBC # BLD AUTO: 4.79 M/MM3 (ref 3.6–5.2)
SODIUM SERPL-SCNC: 141 MMOL/L (ref 136–145)
WBC # BLD AUTO: 8.6 K/MM3 (ref 4–10)

## 2020-03-06 PROCEDURE — 3E0337Z INTRODUCTION OF ELECTROLYTIC AND WATER BALANCE SUBSTANCE INTO PERIPHERAL VEIN, PERCUTANEOUS APPROACH: ICD-10-PCS

## 2020-03-06 PROCEDURE — 3E033NZ INTRODUCTION OF ANALGESICS, HYPNOTICS, SEDATIVES INTO PERIPHERAL VEIN, PERCUTANEOUS APPROACH: ICD-10-PCS

## 2020-03-06 PROCEDURE — 3E033GC INTRODUCTION OF OTHER THERAPEUTIC SUBSTANCE INTO PERIPHERAL VEIN, PERCUTANEOUS APPROACH: ICD-10-PCS

## 2020-03-06 NOTE — PDOC
History of Present Illness





- General


Chief Complaint: Pain


Stated Complaint: ABD PAIN


Time Seen by Provider: 03/06/20 11:42





Past History





- Past Medical History


Allergies/Adverse Reactions: 


                                    Allergies











Allergy/AdvReac Type Severity Reaction Status Date / Time


 


ketorolac tromethamine Allergy Intermediate Rash Verified 10/01/19 11:57





[From Toradol]     


 


oxycodone Allergy Intermediate Itching Verified 11/11/19 07:30


 


colchicine Allergy   Verified 10/01/19 11:57











Home Medications: 


Ambulatory Orders





Venlafaxine HCl ER [Effexor Xr -] 150 mg PO DAILY 05/29/17 


Pregabalin [Lyrica -] 150 mg PO DAILY 10/14/18 


Amlodipine Besylate 5 mg PO DAILY #90 tablet 11/13/19 


Diazepam [Valium] 2 mg PO TID PRN #24 tablet MDD 3 11/13/19 


Oxycodone HCl/Acetaminophen [Percocet 5-325 mg Tablet] 1 - 2 tab PO PRN PRN #26 

tablet MDD 10 11/13/19 


Zolpidem Tartrate [Ambien] 5 mg PO HS PRN #30 tablet MDD 1 11/13/19 








Cancer: Yes (rt breast,cervical ca)


COPD: No





- Surgical History


Abdominal Surgery: Yes (COLON SURGERY;SBO,MESENTERIC ISCHEMIA)





- Immunization History


Immunization Up to Date: Yes





- Psycho Social/Smoking Cessation Hx


Smoking Status: No


Smoking History: Never smoked


Have you smoked in the past 12 months: No


Number of Cigarettes Smoked Daily: 0


If you are a former smoker, when did you quit?: 20 years


Hx Alcohol Use: No


Drug/Substance Use Hx: No


Substance Use Type: None


Hx Substance Use Treatment: No





*Physical Exam





- Vital Signs


                                Last Vital Signs











Temp Pulse Resp BP Pulse Ox


 


 98.1 F   99 H  18   145/95   100 


 


 03/06/20 10:35  03/06/20 10:35  03/06/20 10:35  03/06/20 10:35  03/06/20 10:35














Discharge





- Follow up/Referral


Referrals: 


Lubna Mckinney MD [Primary Care Provider] - 





- Patient Discharge Instructions





- Post Discharge Activity

## 2020-03-06 NOTE — PDOC
Documentation entered by Wilmer Choi SCRIBE, acting as scribe for 

Brandie Bhandari MD.








Brandie Bhandari MD:  This documentation has been prepared by the Steph hunt Nirvannie, SCRIBE, under my direction and personally reviewed by me in 

its entirety.  I confirm that the documentation accurately reflects all work, 

treatment, procedures, and medical decision making performed by me.  





Attending Attestation





- Resident


Resident Name: Sam Martinez





- ED Attending Attestation


I have performed the following: I have examined & evaluated the patient, The 

case was reviewed & discussed with the resident, I agree w/resident's findings &

plan, Exceptions are as noted





- HPI


HPI: 





03/06/20 13:03


The patient is a 55 year old female, with a significant past medical history of 

colonic inertia" s(/p colectomy because of atonic colon? by Dr Hanna at [facility]

2011), SBO (11 requiring surgery), mesenteric ischemia (2015 requiring 

surgery), breast cancer (s/p R mastectomy and lumpectomy), cervical cancer( s/p 

surgery), HTN, and anxiety, who presents to the emergency department with 3 days

of left sided abdominal pain worsened in the left-sided abdominal pain worse in 

the LLQ and associated NBNB watery diarrhea worsened in the past 3 weeks. 








Allergies: Ketorolac, oxycodone, and colchicine 


Past surgical history: Colectomy, SBO surgery, mesenteric ischemia surgery, 

multiple other abdominal and orthopedic surgeries


Social history: Nonsmoker. Denies EtOH use and recreational drug use. 


Primary Care Physician:  Lubna Saleem


Surgeon: Dr Hanna at [facility]








- Physicial Exam


PE: 





GENERAL: Awake, alert, and fully oriented, appears uncomfortable


HEAD: No signs of trauma


EYES: PERRLA, EOMI, sclera anicteric, conjunctiva clear


ENT: Auricles normal inspection, hearing grossly normal, nares patent, 

oropharynx clear without exudates. Dry mucosa


NECK: Normal ROM, supple, no lymphadenopathy, JVD, or masses


LUNGS: Breath sounds equal, clear to auscultation bilaterally.  No wheezes, and 

no crackles


HEART: Regular rate and rhythm, normal S1 and S2, no murmurs, rubs or gallops


ABDOMEN: Soft, +LUQ/LLQ tenderness, normoactive bowel sounds. +Guarding, no 

rebound.  No masses


EXTREMITIES: Normal range of motion, no edema.  No clubbing or cyanosis. No 

cords, erythema, or tenderness


NEUROLOGICAL: Cranial nerves II through XII grossly intact.  Normal speech, no

rmal gait. Motor and sensation intact


SKIN: Warm, dry, normal turgor, no rashes or lesions noted. 








- Medical Decision Making





Pt with significant past abdominal surgical history (prior ischemic bowel, SBO) 

presents with severe L-sided abd pain, with tenderness and fullness on L side. 

Lactate elevated. Given pain medication and IV fluids in ED, CTA to r/o ischemic

bowel vs SBO vs infectious process. Anticipate admission. 





03/06/20 17:53


Late entry. Pt found to have dilated loops of bowel on CTA, does not appear to 

be mesenteric ischemia at this point, but may be ileus vs early SBO. Will 

contact her surgeon at [place], as she has extensive surgical history, and there 

is no general surgeon on call at this hospital today.

## 2020-03-06 NOTE — PDOC
History of Present Illness





- General


Chief Complaint: Pain


Stated Complaint: ABD PAIN


Time Seen by Provider: 03/06/20 11:42


History Source: Patient


Exam Limitations: No Limitations, Clinical Condition





- History of Present Illness


Initial Comments: 








Adelina Turpin is a 54 yo F w a pmh of "colonic inertia" s/p colectomy 

[because of atonic colon? by Dr Hanna at [facility] 2011], SBO in 2011 requiring 

surgery, mesenteric ischemia in 2015 requiring surgery, breast CA s/p R mast

ectomy and lumpectomy, cervical cancer s/p surgery, HTN, and anxiety presents to

the [facility] er with severe left sided abdominal pain worst in the LLQ. The patient 

states she has been having watery diarrhea since January, worsening left sided 

abdominal pain for the past 3 weeks, and severe abdominal pain for the past 3 

days. She has tried hard to avoid coming into the hospital for this but the pain

became too severe this morning so she came in to be evaluated. 





Denies fevers, nausea, vomiting. Denies chest pain, SOB, difficulty breathing, 

numbness, tingling, headache, blurry vision, bloody vomit or diarrhea. 





PCP: Lubna Saleem


Surgeon: Dr Hanna at [facility]


Social Hx: Denies smoking, drinking, or other substance usage


Allergies: Ketorolac and colchicine 


PSH: Colectomy, SBO surgery, mesenteric ischemia surgery, multiple other 

abdominal and orthopedic surgeries














Past History





- Past Medical History


Allergies/Adverse Reactions: 


                                    Allergies











Allergy/AdvReac Type Severity Reaction Status Date / Time


 


ketorolac tromethamine Allergy Intermediate Rash Verified 03/06/20 12:27





[From Toradol]     


 


oxycodone Allergy Intermediate Itching Verified 03/06/20 12:27


 


colchicine Allergy   Verified 03/06/20 12:27











Home Medications: 


Ambulatory Orders





Venlafaxine HCl ER [Effexor Xr -] 150 mg PO DAILY 05/29/17 


Pregabalin [Lyrica -] 150 mg PO DAILY 10/14/18 


Amlodipine Besylate 5 mg PO DAILY #90 tablet 11/13/19 


Diazepam [Valium] 2 mg PO TID PRN #24 tablet MDD 3 11/13/19 


Oxycodone HCl/Acetaminophen [Percocet 5-325 mg Tablet] 1 - 2 tab PO PRN PRN #26 

tablet MDD 10 11/13/19 


Zolpidem Tartrate [Ambien] 5 mg PO HS PRN #30 tablet MDD 1 11/13/19 








Cancer: Yes (rt breast,cervical ca)


COPD: No





- Surgical History


Abdominal Surgery: Yes (COLON SURGERY;SBO,MESENTERIC ISCHEMIA)





- Immunization History


Immunization Up to Date: Yes





- Psycho Social/Smoking Cessation Hx


Smoking Status: No


Smoking History: Never smoked


Have you smoked in the past 12 months: No


Number of Cigarettes Smoked Daily: 0


If you are a former smoker, when did you quit?: 20 years


Hx Alcohol Use: No


Drug/Substance Use Hx: No


Substance Use Type: None


Hx Substance Use Treatment: No





**Review of Systems





- Review of Systems


Able to Perform ROS?: Yes


Comments:: 








CONSTITUTIONAL:


Absent: fever, no chills, no fatigue


EYES:


Absent: visual changes


ENT:


Absent: ear pain, no sore throat


CARDIOVASCULAR:


Absent: chest pain, no palpitations


RESPIRATORY:


Absent: cough, no SOB


GI:


Present: abdominal pain, nausea, diarrhea


Absent: no vomiting, no constipation


GENITOURINARY:


Absent: dysuria, no frequency, no hematuria


MUSKULOSKELETAL:


Absent: back pain, no arthralgia, no myalgia


SKIN:


Absent: rash


NEURO:


Absent: headache











*Physical Exam





- Vital Signs


                                Last Vital Signs











Temp Pulse Resp BP Pulse Ox


 


 98.1 F   99 H  18   145/95   100 


 


 03/06/20 10:35  03/06/20 10:35  03/06/20 10:35  03/06/20 10:35  03/06/20 10:35














- Physical Exam








GENERAL:


Patient is clenched over in the bed screaming in severe pain. Severe apparent 

distress.


HEENT:


Normocephalic, atraumatic. PERRL, EOM intact.


CARDIOVASCULAR:


Tachycardic rate. Normal S1, S2. Regular rhythm.


PULMONARY:


No evidence of respiratory distress. Lungs clear to auscultation bilaterally. No

 wheezing, rales or rhonchi.


ABDOMEN:


Normal bowel sounds. The patient is guarding. There is no rebound. Severe left 

upper, mid and lower tenderness to palpation. Pain appears to be the worst 

immediately lateral to the left ruel-umbilical region. 


EXTREMITIES:


Normal ROM in all four extremities. No gross deformities.


SKIN:


Warm, dry.  No rash


NEUROLOGICAL:


No focal neurological deficits.














ED Treatment Course





- LABORATORY


CBC & Chemistry Diagram: 


                                 03/06/20 12:11





                                 03/06/20 12:11





- RADIOLOGY


Radiograph Interpretation: 





CTA: Abdomen/pelvis CT radiography (without and with contrast) 


 


 Clinical information: evaluate for mesenteric ischemia; left abdominal pain 


 


 Multiplanar, multiphase imaging was performed following the intravenous 

administration of nonionic 


contrast in addition to preliminary noncontrast scanning. Enteric contrast was 

not administered. 


 


 No evidence of pneumoperitoneum, pneumatosis, portal venous air/gas, abscess, 

free intraperitoneal 


fluid or bowel obstruction. 


 


 Several mildly dilated small bowel loops are seen within the central and left 

lateral thirds of the


mid abdomen with a 2.9 cm maximum luminal diameter. There is no definite 

associated wall edema or 


perienteric edema/fluid. 


 


 The celiac artery as well as the superior and inferior mesenteric arteries 

appear patent without 


discrete stenosis or obvious intraluminal defect. 


 


 There is no CT evidence of portomesenteric venous thrombosis. 


 


 Stable 1.5 cm enhancing right hepatic lobe lesion in comparison to a CT exam of

 10/3/2017 possibly 


representing a hemangioma. Stable subcentimeter right hepatic lobe cyst. There 

is probable diffuse 


fatty infiltration of the liver. 


 


 As on the 2017 CT exam the patient is status post subtotal colectomy with 

apparent ileorectal 


anastomosis. 


 


 The spleen, pancreas, gallbladder, adrenal glands and kidneys demonstrate no 

discrete pathology. 


Small left renal cortical cyst. There is no aortic aneurysm. 


 


 Interval slight enlargement of a left periaortic retroperitoneal lymph node is 

noted with a current


diameter of 0.8 cm. 


 


 No obvious acute osseous pathology is seen. 


 


 


 Impression: 


 


 No CT evidence of mesenteric ischemia. Early acute mesenteric ischemia may not 

be demonstrable on 


CT. 


 


 Similar to a 2017 CT exam several mildly dilated small bowel loops are seen 

within the central and 


left lateral thirds of the mid abdomen which could be on the basis of a mild 

ileus versus mild/early


small bowel obstruction. Correlation with close follow-up radiography or CT is 

suggested. 


 


 Stable 1.5 cm right hepatic lobe enhancing lesion possibly representing a 

hemangioma. 


 


 Probable diffuse hepatic steatosis. 


 


 As on the prior exam the patient is status post subtotal colectomy with an 

apparent ileorectal 


anastomosis. 


 


 Interval slight enlargement of a left periaortic retroperitoneal lymph node is 

seen. Correlation 


with 3 month follow-up CT is suggested to document stability and lack of 

developing pathology. 


 

















Medical Decision Making





- Medical Decision Making





Adelina Turpin is a 54 yo F w a pmh of "colonic inertia" s/p colectomy 

[because of atonic colon? by Dr Hanna at [facility] 2011], SBO in 2011 requiring 

surgery, mesenteric ischemia in 2015 requiring surgery, breast CA s/p R 

mastectomy and lumpectomy, cervical cancer s/p surgery, HTN, and anxiety 

presents to the [facility] er with severe left sided abdominal pain worst in the LLQ. 

The patient states she has been having watery diarrhea since January, worsening 

left sided abdominal pain for the past 3 weeks, and severe abdominal pain for 

the past 3 days. She has tried hard to avoid coming into the hospital for this 

but the pain became too severe this morning so she came in to be evaluated. 





Vital Signs











Temp Pulse Resp BP Pulse Ox


 


 98.1 F   99 H  18   145/95   100 


 


 03/06/20 10:35  03/06/20 10:35  03/06/20 10:35  03/06/20 10:35  03/06/20 10:35











DDx IBNLT: mesenteric ischemia, ischemic gut, SBO, diverticulitis, 

electrolyte/metabolic disturbance, anemia





Plan: labs, EKG, Abdominal CT w/ con vs CTA, analgesia, IV hydration, likely 

surgical consult and admission.





Labs: Mildly low mag and potassium - repleting





EKG: NS rate of 75, narrow complexes, normal axis, no hypertrophy, no ST 

elevations or depressions, septal TWI's, QTc 475, 





CT: Abdomen/pelvis CT radiography (without and with contrast) Clinical 

information: evaluate for mesenteric ischemia; left abdominal pain Multiplanar, 

multiphase imaging was performed following the intravenous administration of 

nonionic contrast in addition to preliminary noncontrast scanning. Enteric 

contrast was not administered. No evidence of pneumoperitoneum, pneumatosis, 

portal venous air/gas, abscess, free intraperitoneal fluid or bowel obstruction.

Several mildly dilated small bowel loops are seen within the central and left 

lateral thirds of the mid abdomen with a 2.9 cm maximum luminal diameter. There 

is no definite associated wall edema or perienteric edema/fluid. The celiac 

artery as well as the superior and inferior mesenteric arteries appear patent 

without discrete stenosis or obvious intraluminal defect. There is no CT 

evidence of portomesenteric venous thrombosis. Stable 1.5 cm enhancing right 

hepatic lobe lesion in comparison to a CT exam of 10/3/2017 possibly repre

senting a hemangioma. Stable subcentimeter right hepatic lobe cyst. There is 

probable diffuse fatty infiltration of the liver. As on the 2017 CT exam the 

patient is status post subtotal colectomy with apparent ileorectal anastomosis. 

The spleen, pancreas, gallbladder, adrenal glands and kidneys demonstrate no 

discrete pathology. Small left renal cortical cyst. There is no aortic aneurysm.

Interval slight enlargement of a left periaortic retroperitoneal lymph node is 

noted with a current diameter of 0.8 cm. No obvious acute osseous pathology is 

seen. Impression: No CT evidence of mesenteric ischemia. Early acute mesenteric 

ischemia may not be demonstrable on CT. Similar to a 2017 CT exam several mildly

dilated small bowel loops are seen within the central and left lateral thirds of

the mid abdomen which could be on the basis of a mild ileus versus mild/early 

small bowel obstruction. Correlation with close follow-up radiography or CT is 

suggested. Stable 1.5 cm right hepatic lobe enhancing lesion possibly 

representing a hemangioma. Probable diffuse hepatic steatosis. As on the prior 

exam the patient is status post subtotal colectomy with an apparent ileorectal 

anastomosis. Interval slight enlargement of a left periaortic retroperitoneal 

lymph node is seen. Correlation with 3 month follow-up CT is suggested to 

document stability and lack of developing pathology. 





Re-assessment: Patient has persistent abdominal pain despite multiple rounds of 

Morphine


- Giving patient hydromorphone





MDM: Patient has an SBO and we have no surgeon on call today at [facility]. We 

considered sending patient over to [facility] however the patient requested to 

be sent over to [facility] because all of her doctors are there and Dr. Hanna has 

operated on her in the past. She specifically requested to be sent over to the 

service of Dr. Hanna at [facility]


- I called Dr. Hanna who says he will accept the patient and we should call the 

transfer center to arrange transport over to [facility]





Disposition: Transfer to [facility]





- Initiating transfer to [facility] through transfer center


- Face sheet to be faxed to  - [phone]


- Dr. Hanna - accepting physician over at [facility]


- CD copy of CTA requested from radiology for copy to send with transfer





03/06/20 19:57


I spoke with the Transfer center at 6:30 pm asking for an ETA and they had no 

update for me. They said they would call back 1928 when they have an ETA





03/06/20 20:27


Called up transfer center for ETA


- Alfredo told me ambulance is on route and should be here in 15 minutes at 8:45





Ambulance took patient out of ER at 9 pm to [facility]








Discharge





- Discharge Information


Problems reviewed: Yes


Clinical Impression/Diagnosis: 


 SBO (small bowel obstruction)





Abdominal pain


Qualifiers:


 Abdominal location: left lower quadrant Qualified Code(s): R10.32 - Left lower 

quadrant pain





Condition: Stable


Disposition: TRANSFER ACUTE CARE/OTHER HOSP





- Admission


No





- Follow up/Referral


Referrals: 


Lubna Mckinney MD [Primary Care Provider] - 





- Patient Discharge Instructions





- Post Discharge Activity


Work/Back to School Note:  Back to Work





- Transfer to Acute Care Facility


Receiving Facility Name: [facility] ([facility] ([facility])


Accepting Physician:: Dr. Hanna


Transfer Comment: 





Patient with SBO, Dr. Hanna operated on this patient in the past.

## 2020-03-07 NOTE — EKG
Test Reason : 

Blood Pressure : ***/*** mmHG

Vent. Rate : 075 BPM     Atrial Rate : 075 BPM

   P-R Int : 126 ms          QRS Dur : 090 ms

    QT Int : 426 ms       P-R-T Axes : 039 000 -09 degrees

   QTc Int : 475 ms

 

*** POOR DATA QUALITY, INTERPRETATION MAY BE ADVERSELY AFFECTED

NORMAL SINUS RHYTHM

T WAVE ABNORMALITY, CONSIDER ANTERIOR ISCHEMIA

ABNORMAL ECG

 

Confirmed by Terrance Galvez MD ([id number]) on 3/7/2020 10:30:04 AM

 

Referred By:             Confirmed By:Terrance Galvez MD

## 2020-07-17 ENCOUNTER — HOSPITAL ENCOUNTER (OUTPATIENT)
Dept: HOSPITAL 74 - JASU-ENDO | Age: 55
Discharge: HOME | End: 2020-07-17
Attending: INTERNAL MEDICINE
Payer: COMMERCIAL

## 2020-07-17 VITALS — DIASTOLIC BLOOD PRESSURE: 95 MMHG | HEART RATE: 83 BPM | SYSTOLIC BLOOD PRESSURE: 125 MMHG

## 2020-07-17 VITALS — BODY MASS INDEX: 32.7 KG/M2

## 2020-07-17 VITALS — TEMPERATURE: 98 F

## 2020-07-17 DIAGNOSIS — K64.8: ICD-10-CM

## 2020-07-17 DIAGNOSIS — R10.84: ICD-10-CM

## 2020-07-17 DIAGNOSIS — K29.50: ICD-10-CM

## 2020-07-17 DIAGNOSIS — Z85.3: ICD-10-CM

## 2020-07-17 DIAGNOSIS — Z12.11: Primary | ICD-10-CM

## 2020-07-17 DIAGNOSIS — Z90.49: ICD-10-CM

## 2020-07-17 PROCEDURE — 43239 EGD BIOPSY SINGLE/MULTIPLE: CPT

## 2020-07-17 PROCEDURE — 0DJD8ZZ INSPECTION OF LOWER INTESTINAL TRACT, VIA NATURAL OR ARTIFICIAL OPENING ENDOSCOPIC: ICD-10-PCS | Performed by: INTERNAL MEDICINE

## 2020-07-17 PROCEDURE — 0DB98ZX EXCISION OF DUODENUM, VIA NATURAL OR ARTIFICIAL OPENING ENDOSCOPIC, DIAGNOSTIC: ICD-10-PCS | Performed by: INTERNAL MEDICINE

## 2020-07-17 PROCEDURE — 0DB68ZX EXCISION OF STOMACH, VIA NATURAL OR ARTIFICIAL OPENING ENDOSCOPIC, DIAGNOSTIC: ICD-10-PCS | Performed by: INTERNAL MEDICINE

## 2020-11-14 ENCOUNTER — HOSPITAL ENCOUNTER (INPATIENT)
Dept: HOSPITAL 74 - JER | Age: 55
LOS: 1 days | Discharge: LEFT BEFORE DISCHARGE | DRG: 179 | End: 2020-11-15
Attending: INTERNAL MEDICINE | Admitting: INTERNAL MEDICINE
Payer: COMMERCIAL

## 2020-11-14 VITALS — HEART RATE: 97 BPM | DIASTOLIC BLOOD PRESSURE: 85 MMHG | SYSTOLIC BLOOD PRESSURE: 121 MMHG | TEMPERATURE: 99.3 F

## 2020-11-14 VITALS — BODY MASS INDEX: 30.8 KG/M2

## 2020-11-14 DIAGNOSIS — I10: ICD-10-CM

## 2020-11-14 DIAGNOSIS — Z85.3: ICD-10-CM

## 2020-11-14 DIAGNOSIS — R00.0: ICD-10-CM

## 2020-11-14 DIAGNOSIS — R09.02: ICD-10-CM

## 2020-11-14 DIAGNOSIS — Z85.41: ICD-10-CM

## 2020-11-14 DIAGNOSIS — U07.1: Primary | ICD-10-CM

## 2020-11-14 LAB
ALBUMIN SERPL-MCNC: 3.3 G/DL (ref 3.4–5)
ALP SERPL-CCNC: 102 U/L (ref 45–117)
ALT SERPL-CCNC: 46 U/L (ref 13–61)
ANION GAP SERPL CALC-SCNC: 10 MMOL/L (ref 8–16)
APTT BLD: 39 SECONDS (ref 25.2–36.5)
AST SERPL-CCNC: 49 U/L (ref 15–37)
BASOPHILS # BLD: 0.5 % (ref 0–2)
BILIRUB DIRECT SERPL-MCNC: 484 U/L (ref 84–246)
BILIRUB SERPL-MCNC: 0.5 MG/DL (ref 0.2–1)
BNP SERPL-MCNC: 127 PG/ML (ref 5–125)
BUN SERPL-MCNC: 13.4 MG/DL (ref 7–18)
CALCIUM SERPL-MCNC: 8.7 MG/DL (ref 8.5–10.1)
CHLORIDE SERPL-SCNC: 98 MMOL/L (ref 98–107)
CO2 SERPL-SCNC: 27 MMOL/L (ref 21–32)
CREAT SERPL-MCNC: 0.9 MG/DL (ref 0.55–1.3)
DEPRECATED RDW RBC AUTO: 14.5 % (ref 11.6–15.6)
EOSINOPHIL # BLD: 0 % (ref 0–4.5)
GLUCOSE SERPL-MCNC: 81 MG/DL (ref 74–106)
HCT VFR BLD CALC: 38.9 % (ref 32.4–45.2)
HGB BLD-MCNC: 12.7 GM/DL (ref 10.7–15.3)
INR BLD: 2.09 (ref 0.83–1.09)
LYMPHOCYTES # BLD: 21.7 % (ref 8–40)
MCH RBC QN AUTO: 29.2 PG (ref 25.7–33.7)
MCHC RBC AUTO-ENTMCNC: 32.7 G/DL (ref 32–36)
MCV RBC: 89.6 FL (ref 80–96)
MONOCYTES # BLD AUTO: 2.8 % (ref 3.8–10.2)
NEUTROPHILS # BLD: 75 % (ref 42.8–82.8)
PLATELET # BLD AUTO: 306 K/MM3 (ref 134–434)
PMV BLD: 8.4 FL (ref 7.5–11.1)
POTASSIUM SERPLBLD-SCNC: 3.7 MMOL/L (ref 3.5–5.1)
PROT SERPL-MCNC: 7.7 G/DL (ref 6.4–8.2)
PT PNL PPP: 24.8 SEC (ref 9.7–13)
RBC # BLD AUTO: 4.34 M/MM3 (ref 3.6–5.2)
SODIUM SERPL-SCNC: 135 MMOL/L (ref 136–145)
WBC # BLD AUTO: 9.9 K/MM3 (ref 4–10)

## 2020-11-14 PROCEDURE — C9803 HOPD COVID-19 SPEC COLLECT: HCPCS

## 2020-11-14 PROCEDURE — U0003 INFECTIOUS AGENT DETECTION BY NUCLEIC ACID (DNA OR RNA); SEVERE ACUTE RESPIRATORY SYNDROME CORONAVIRUS 2 (SARS-COV-2) (CORONAVIRUS DISEASE [COVID-19]), AMPLIFIED PROBE TECHNIQUE, MAKING USE OF HIGH THROUGHPUT TECHNOLOGIES AS DESCRIBED BY CMS-2020-01-R: HCPCS

## 2021-08-19 NOTE — PN
Patient calling and states that her insurance will only authorize !/2 her Rx for th Prevpac and that a prior Authorization needs to be done for the other half of the Rx.  Please advise.  Chaya Gonzalez MA  [facility]  2nd Floor  Primary Care     Progress Note (short form)





- Note


Progress Note: 





NEUROSURGERY





POD #1 s/p C5 Caudal hemicorpectomy, C6 rostral hemicorpectomy with 

decompression of spondylosis and reconstruction with PEEK cage and anterior 

plate





Alert. 


HOB at 30 degrees. Patient wearing her c-collar as instructed.


C/o posterior neck pain (muscular in nature). States she didn't sleep well 2/2 

discomfort. Incisional tenderness.


She has gotten oob and ambulated to bathroom without assistance. Voiding 

spontaneously.


Denies n/v/f/c, CP, palpitations, SOB, [place]ON[place] or UE numbness/tingling





 Last Vital Signs











Temp Pulse Resp BP Pulse Ox


 


 98.4 F   85   20   141/82   98 


 


 11/12/19 06:00  11/12/19 06:00  11/12/19 06:00  11/12/19 06:00  11/11/19 14:30








 HIWOT Output since Surgery











  11/11/19 11/11/19 11/11/19 11/12/19





  17:36 22:48 23:00 


 


HIWOT 20 10 0 10











Gen: alert. 


Neck: dressing c/d/i. No hematoma. HIWOT serous


Neuro: GMNVI bilat





Problem List





- Problems


(1) Cervical spondylosis


Assessment/Plan: 





Incentive spirometer


Wear your c-collar 23/24 hrs/day (may remove while eating)


OOB and ambulate


Patient states she's still having pain and wishes to stay another day/night for 

pain management


Ofirmev 1gm


Diet as tolerated


Cont medical management


Neurosurgery to cont following


Will dc her HIWOT later today.


Code(s): M47.812 - SPONDYLOSIS W/O MYELOPATHY OR RADICULOPATHY, CERVICAL REGION

   





(2) Anxiety


Code(s): F41.9 - ANXIETY DISORDER, UNSPECIFIED   





(3) Headache


Code(s): R51 - HEADACHE